# Patient Record
Sex: MALE | Race: WHITE | Employment: FULL TIME | URBAN - METROPOLITAN AREA
[De-identification: names, ages, dates, MRNs, and addresses within clinical notes are randomized per-mention and may not be internally consistent; named-entity substitution may affect disease eponyms.]

---

## 2019-01-29 ENCOUNTER — OFFICE VISIT (OUTPATIENT)
Dept: INTERNAL MEDICINE CLINIC | Facility: CLINIC | Age: 64
End: 2019-01-29
Payer: COMMERCIAL

## 2019-01-29 VITALS
TEMPERATURE: 98 F | RESPIRATION RATE: 16 BRPM | WEIGHT: 265 LBS | HEART RATE: 76 BPM | BODY MASS INDEX: 35.12 KG/M2 | SYSTOLIC BLOOD PRESSURE: 135 MMHG | HEIGHT: 73 IN | DIASTOLIC BLOOD PRESSURE: 85 MMHG

## 2019-01-29 DIAGNOSIS — Z13.220 LIPID SCREENING: ICD-10-CM

## 2019-01-29 DIAGNOSIS — Z12.11 COLON CANCER SCREENING: ICD-10-CM

## 2019-01-29 DIAGNOSIS — I73.9 CLAUDICATION OF BOTH LOWER EXTREMITIES (HCC): ICD-10-CM

## 2019-01-29 DIAGNOSIS — Z13.29 THYROID DISORDER SCREEN: ICD-10-CM

## 2019-01-29 DIAGNOSIS — M72.2 PLANTAR FASCIITIS, BILATERAL: ICD-10-CM

## 2019-01-29 DIAGNOSIS — Z13.0 SCREENING, IRON DEFICIENCY ANEMIA: ICD-10-CM

## 2019-01-29 DIAGNOSIS — Z23 INFLUENZA VACCINE NEEDED: ICD-10-CM

## 2019-01-29 DIAGNOSIS — Z00.00 ANNUAL PHYSICAL EXAM: Primary | ICD-10-CM

## 2019-01-29 DIAGNOSIS — Z13.89 SCREENING FOR GENITOURINARY CONDITION: ICD-10-CM

## 2019-01-29 DIAGNOSIS — M79.89 RIGHT LEG SWELLING: ICD-10-CM

## 2019-01-29 DIAGNOSIS — Z00.00 LABORATORY EXAMINATION ORDERED AS PART OF A ROUTINE GENERAL MEDICAL EXAMINATION: ICD-10-CM

## 2019-01-29 DIAGNOSIS — Z01.84 IMMUNITY STATUS TESTING: ICD-10-CM

## 2019-01-29 DIAGNOSIS — Z12.5 PROSTATE CANCER SCREENING: ICD-10-CM

## 2019-01-29 DIAGNOSIS — E78.2 MIXED HYPERLIPIDEMIA: ICD-10-CM

## 2019-01-29 PROBLEM — Z96.642 HISTORY OF LEFT HIP REPLACEMENT: Status: ACTIVE | Noted: 2019-01-29

## 2019-01-29 PROCEDURE — 99396 PREV VISIT EST AGE 40-64: CPT | Performed by: INTERNAL MEDICINE

## 2019-01-29 PROCEDURE — 99213 OFFICE O/P EST LOW 20 MIN: CPT | Performed by: INTERNAL MEDICINE

## 2019-01-29 PROCEDURE — 90686 IIV4 VACC NO PRSV 0.5 ML IM: CPT | Performed by: INTERNAL MEDICINE

## 2019-01-29 PROCEDURE — 90471 IMMUNIZATION ADMIN: CPT | Performed by: INTERNAL MEDICINE

## 2019-01-29 RX ORDER — VALSARTAN 160 MG/1
160 TABLET ORAL DAILY
COMMUNITY
End: 2019-01-29 | Stop reason: DRUGHIGH

## 2019-01-29 RX ORDER — ROSUVASTATIN CALCIUM 10 MG/1
10 TABLET, COATED ORAL DAILY
Qty: 90 TABLET | Refills: 1 | Status: SHIPPED | OUTPATIENT
Start: 2019-01-29 | End: 2019-07-16

## 2019-01-29 RX ORDER — NIFEDIPINE 60 MG/1
60 TABLET, FILM COATED, EXTENDED RELEASE ORAL DAILY
COMMUNITY
End: 2019-01-29

## 2019-01-29 RX ORDER — METOPROLOL TARTRATE 50 MG/1
25 TABLET, FILM COATED ORAL 2 TIMES DAILY
COMMUNITY
End: 2019-10-21

## 2019-01-29 RX ORDER — VALSARTAN AND HYDROCHLOROTHIAZIDE 320; 25 MG/1; MG/1
1 TABLET, FILM COATED ORAL DAILY
Qty: 90 TABLET | Refills: 1 | Status: SHIPPED | OUTPATIENT
Start: 2019-01-29 | End: 2019-07-16

## 2019-01-30 NOTE — PROGRESS NOTES
HPI:    Patient ID: David Venegas is a 58year old male. HTN     Foot Pain        David Venegas is a 58year old male who presents for a complete physical exam. He returns after years under the care of the [place]  HPI:   Pt complains of :  1.  Right leg ed (CRESTOR) 10 MG Oral Tab Take 1 tablet (10 mg total) by mouth daily. Disp: 90 tablet Rfl: 1   Multiple Vitamin (MULTI-VITAMIN OR) Take 1 tablet by mouth daily.    Disp:  Rfl:       Past Medical History:   Diagnosis Date   • Acute MI ([facility] [address]    • Alopecia    • auscultation  CARDIO: RRR without murmur, decrease DP pulses b/l, right leg sweling and edema, no erythemai  GI: good BS's,no masses, HSM or tenderness  : deferred  MUSCULOSKELETAL: back is not tender,FROM of the back, right calf=47 cm, left calf=43 cm DIARRHEA  Viibryd                 NAUSEA ONLY    Comment:\"TABS\"   PHYSICAL EXAM:   Physical Exam           ASSESSMENT/PLAN:   Annual physical exam  (primary encounter diagnosis)  Lipid screening  Screening for genitourinary condition  Screening, iron def

## 2019-02-04 ENCOUNTER — HOSPITAL ENCOUNTER (OUTPATIENT)
Dept: ULTRASOUND IMAGING | Facility: HOSPITAL | Age: 64
Discharge: HOME | End: 2019-02-04
Attending: INTERNAL MEDICINE
Payer: COMMERCIAL

## 2019-02-04 DIAGNOSIS — I73.9 CLAUDICATION OF BOTH LOWER EXTREMITIES (HCC): ICD-10-CM

## 2019-02-04 DIAGNOSIS — M79.89 RIGHT LEG SWELLING: ICD-10-CM

## 2019-02-04 PROCEDURE — 93971 EXTREMITY STUDY: CPT | Performed by: INTERNAL MEDICINE

## 2019-02-04 PROCEDURE — 93922 UPR/L XTREMITY ART 2 LEVELS: CPT | Performed by: INTERNAL MEDICINE

## 2019-02-05 ENCOUNTER — TELEPHONE (OUTPATIENT)
Dept: INTERNAL MEDICINE CLINIC | Facility: CLINIC | Age: 64
End: 2019-02-05

## 2019-02-05 ENCOUNTER — LAB ENCOUNTER (OUTPATIENT)
Dept: LAB | Age: 64
End: 2019-02-05
Attending: INTERNAL MEDICINE
Payer: COMMERCIAL

## 2019-02-05 DIAGNOSIS — I80.9 THROMBOPHLEBITIS: Primary | ICD-10-CM

## 2019-02-05 DIAGNOSIS — Z12.5 PROSTATE CANCER SCREENING: ICD-10-CM

## 2019-02-05 DIAGNOSIS — Z13.29 THYROID DISORDER SCREEN: ICD-10-CM

## 2019-02-05 DIAGNOSIS — D72.829 LEUKOCYTOSIS, UNSPECIFIED TYPE: Primary | ICD-10-CM

## 2019-02-05 DIAGNOSIS — Z13.220 LIPID SCREENING: ICD-10-CM

## 2019-02-05 DIAGNOSIS — Z01.84 IMMUNITY STATUS TESTING: ICD-10-CM

## 2019-02-05 DIAGNOSIS — Z13.89 SCREENING FOR GENITOURINARY CONDITION: ICD-10-CM

## 2019-02-05 DIAGNOSIS — Z00.00 LABORATORY EXAMINATION ORDERED AS PART OF A ROUTINE GENERAL MEDICAL EXAMINATION: ICD-10-CM

## 2019-02-05 DIAGNOSIS — Z13.0 SCREENING, IRON DEFICIENCY ANEMIA: ICD-10-CM

## 2019-02-05 LAB
ALBUMIN SERPL-MCNC: 3.6 G/DL (ref 3.1–4.5)
ALBUMIN/GLOB SERPL: 0.9 {RATIO} (ref 1–2)
ALP LIVER SERPL-CCNC: 57 U/L (ref 45–117)
ALT SERPL-CCNC: 26 U/L (ref 17–63)
ANION GAP SERPL CALC-SCNC: 9 MMOL/L (ref 0–18)
AST SERPL-CCNC: 18 U/L (ref 15–41)
BASOPHILS # BLD AUTO: 0.03 X10(3) UL (ref 0–0.2)
BASOPHILS NFR BLD AUTO: 0.2 %
BILIRUB SERPL-MCNC: 0.8 MG/DL (ref 0.1–2)
BILIRUB UR QL STRIP.AUTO: NEGATIVE
BUN BLD-MCNC: 18 MG/DL (ref 8–20)
BUN/CREAT SERPL: 12.2 (ref 10–20)
CALCIUM BLD-MCNC: 8.9 MG/DL (ref 8.3–10.3)
CHLORIDE SERPL-SCNC: 102 MMOL/L (ref 101–111)
CHOLEST SMN-MCNC: 120 MG/DL (ref ?–200)
CLARITY UR REFRACT.AUTO: CLEAR
CO2 SERPL-SCNC: 27 MMOL/L (ref 22–32)
COLOR UR AUTO: YELLOW
COMPLEXED PSA SERPL-MCNC: 2.37 NG/ML (ref 0.01–4)
CREAT BLD-MCNC: 1.48 MG/DL (ref 0.7–1.3)
DEPRECATED RDW RBC AUTO: 43.7 FL (ref 35.1–46.3)
EOSINOPHIL # BLD AUTO: 0.04 X10(3) UL (ref 0–0.7)
EOSINOPHIL NFR BLD AUTO: 0.3 %
ERYTHROCYTE [DISTWIDTH] IN BLOOD BY AUTOMATED COUNT: 14.2 % (ref 11–15)
EST. AVERAGE GLUCOSE BLD GHB EST-MCNC: 123 MG/DL (ref 68–126)
GLOBULIN PLAS-MCNC: 4 G/DL (ref 2.8–4.4)
GLUCOSE BLD-MCNC: 111 MG/DL (ref 70–99)
GLUCOSE UR STRIP.AUTO-MCNC: NEGATIVE MG/DL
HBA1C MFR BLD HPLC: 5.9 % (ref ?–5.7)
HCT VFR BLD AUTO: 48.1 % (ref 39–53)
HCV AB SERPL QL IA: NONREACTIVE
HDLC SERPL-MCNC: 35 MG/DL (ref 40–59)
HGB BLD-MCNC: 16.4 G/DL (ref 13–17.5)
IMM GRANULOCYTES # BLD AUTO: 0.03 X10(3) UL (ref 0–1)
IMM GRANULOCYTES NFR BLD: 0.2 %
KETONES UR STRIP.AUTO-MCNC: NEGATIVE MG/DL
LDLC SERPL CALC-MCNC: 71 MG/DL (ref ?–100)
LEUKOCYTE ESTERASE UR QL STRIP.AUTO: NEGATIVE
LYMPHOCYTES # BLD AUTO: 1.51 X10(3) UL (ref 1–4)
LYMPHOCYTES NFR BLD AUTO: 11.9 %
M PROTEIN MFR SERPL ELPH: 7.6 G/DL (ref 6.4–8.2)
MCH RBC QN AUTO: 29 PG (ref 26–34)
MCHC RBC AUTO-ENTMCNC: 34.1 G/DL (ref 31–37)
MCV RBC AUTO: 85.1 FL (ref 80–100)
MONOCYTES # BLD AUTO: 0.92 X10(3) UL (ref 0.1–1)
MONOCYTES NFR BLD AUTO: 7.2 %
NEUTROPHILS # BLD AUTO: 10.21 X10 (3) UL (ref 1.5–7.7)
NEUTROPHILS # BLD AUTO: 10.21 X10(3) UL (ref 1.5–7.7)
NEUTROPHILS NFR BLD AUTO: 80.2 %
NITRITE UR QL STRIP.AUTO: NEGATIVE
NONHDLC SERPL-MCNC: 85 MG/DL (ref ?–130)
OSMOLALITY SERPL CALC.SUM OF ELEC: 289 MOSM/KG (ref 275–295)
PH UR STRIP.AUTO: 5 [PH] (ref 4.5–8)
PLATELET # BLD AUTO: 249 10(3)UL (ref 150–450)
POTASSIUM SERPL-SCNC: 4 MMOL/L (ref 3.6–5.1)
PROT UR STRIP.AUTO-MCNC: NEGATIVE MG/DL
RBC # BLD AUTO: 5.65 X10(6)UL (ref 4.3–5.7)
RBC UR QL AUTO: NEGATIVE
SODIUM SERPL-SCNC: 138 MMOL/L (ref 136–144)
SP GR UR STRIP.AUTO: 1.02 (ref 1–1.03)
TRIGL SERPL-MCNC: 71 MG/DL (ref 30–149)
TSI SER-ACNC: 1.8 MIU/ML (ref 0.35–5.5)
UROBILINOGEN UR STRIP.AUTO-MCNC: <2 MG/DL
VLDLC SERPL CALC-MCNC: 14 MG/DL (ref 0–30)
WBC # BLD AUTO: 12.7 X10(3) UL (ref 4–11)

## 2019-02-05 PROCEDURE — 80061 LIPID PANEL: CPT | Performed by: INTERNAL MEDICINE

## 2019-02-05 PROCEDURE — 86803 HEPATITIS C AB TEST: CPT | Performed by: INTERNAL MEDICINE

## 2019-02-05 PROCEDURE — 84153 ASSAY OF PSA TOTAL: CPT | Performed by: INTERNAL MEDICINE

## 2019-02-05 PROCEDURE — 81003 URINALYSIS AUTO W/O SCOPE: CPT | Performed by: INTERNAL MEDICINE

## 2019-02-05 PROCEDURE — 80050 GENERAL HEALTH PANEL: CPT | Performed by: INTERNAL MEDICINE

## 2019-02-05 PROCEDURE — 83036 HEMOGLOBIN GLYCOSYLATED A1C: CPT | Performed by: INTERNAL MEDICINE

## 2019-02-05 PROCEDURE — 36415 COLL VENOUS BLD VENIPUNCTURE: CPT | Performed by: INTERNAL MEDICINE

## 2019-02-05 RX ORDER — NAPROXEN 500 MG/1
500 TABLET ORAL 2 TIMES DAILY WITH MEALS
Qty: 28 TABLET | Refills: 0 | Status: SHIPPED | OUTPATIENT
Start: 2019-02-05 | End: 2019-03-01

## 2019-02-05 NOTE — TELEPHONE ENCOUNTER
The pt is aware of the results and Rx was sent to retail. The pt did admit to sitting for 8-9 hours a day without walking breaks. The pt will begin to have more movement throughout the day.

## 2019-02-05 NOTE — TELEPHONE ENCOUNTER
----- Message from Ashwin Caro MD sent at 2/5/2019  2:58 PM CST -----  Review at follow up  Check LAP score for leukocytosis

## 2019-02-05 NOTE — TELEPHONE ENCOUNTER
----- Message from Oswald Davis MD sent at 2/4/2019  7:23 PM CST -----  No dvt  thrombophlebitis in the proximal calf segment of the right greater saphenous vein.  Treat with naproxen 500mg bid x 14 days

## 2019-02-06 ENCOUNTER — APPOINTMENT (OUTPATIENT)
Dept: LAB | Age: 64
End: 2019-02-06
Attending: INTERNAL MEDICINE
Payer: COMMERCIAL

## 2019-02-06 DIAGNOSIS — D72.829 LEUKOCYTOSIS, UNSPECIFIED TYPE: ICD-10-CM

## 2019-02-06 PROCEDURE — 36415 COLL VENOUS BLD VENIPUNCTURE: CPT | Performed by: INTERNAL MEDICINE

## 2019-02-06 PROCEDURE — 85540 WBC ALKALINE PHOSPHATASE: CPT | Performed by: INTERNAL MEDICINE

## 2019-02-06 NOTE — TELEPHONE ENCOUNTER
Relayed results to patient and he agreed with plan.  Will complete lab on Friday and follow up on Tuesday as scheduled

## 2019-02-08 LAB — LEUKOCYTE ALKALINE PHOSPHATASE: 192

## 2019-02-12 ENCOUNTER — OFFICE VISIT (OUTPATIENT)
Dept: INTERNAL MEDICINE CLINIC | Facility: CLINIC | Age: 64
End: 2019-02-12
Payer: MEDICARE

## 2019-02-12 VITALS
BODY MASS INDEX: 34.72 KG/M2 | TEMPERATURE: 98 F | HEIGHT: 73 IN | SYSTOLIC BLOOD PRESSURE: 126 MMHG | DIASTOLIC BLOOD PRESSURE: 84 MMHG | HEART RATE: 69 BPM | WEIGHT: 262 LBS | RESPIRATION RATE: 16 BRPM

## 2019-02-12 DIAGNOSIS — I10 ESSENTIAL HYPERTENSION: Primary | ICD-10-CM

## 2019-02-12 DIAGNOSIS — Z23 NEED FOR PNEUMOCOCCAL VACCINATION: ICD-10-CM

## 2019-02-12 DIAGNOSIS — J01.00 ACUTE NON-RECURRENT MAXILLARY SINUSITIS: ICD-10-CM

## 2019-02-12 PROCEDURE — 99213 OFFICE O/P EST LOW 20 MIN: CPT | Performed by: INTERNAL MEDICINE

## 2019-02-12 PROCEDURE — G0009 ADMIN PNEUMOCOCCAL VACCINE: HCPCS | Performed by: INTERNAL MEDICINE

## 2019-02-12 PROCEDURE — 90732 PPSV23 VACC 2 YRS+ SUBQ/IM: CPT | Performed by: INTERNAL MEDICINE

## 2019-02-12 RX ORDER — AZITHROMYCIN 250 MG/1
TABLET, FILM COATED ORAL
Qty: 6 TABLET | Refills: 0 | Status: SHIPPED | OUTPATIENT
Start: 2019-02-12 | End: 2019-05-14 | Stop reason: ALTCHOICE

## 2019-02-12 NOTE — PROGRESS NOTES
HPI:    Patient ID: Marc Antonio is a 71year old male. HPI  Marc Antonio is a 71year old male. HPI:   Patient presents for recheck of his hypertension.  Pt has been taking medications as instructed, no medication side effects, home BP monitorin 1   Rosuvastatin Calcium (CRESTOR) 10 MG Oral Tab Take 1 tablet (10 mg total) by mouth daily. Disp: 90 tablet Rfl: 1   Multiple Vitamin (MULTI-VITAMIN OR) Take 1 tablet by mouth daily.    Disp:  Rfl:       Past Medical History:   Diagnosis Date   • Acute MI Current Outpatient Medications:  azithromycin (ZITHROMAX Z-KENNEDY) 250 MG Oral Tab Take two tablets by mouth today, then one tablet daily.  Disp: 6 tablet Rfl: 0   naproxen 500 MG Oral Tab Take 1 tablet (500 mg total) by mouth 2 (two) times daily with meals

## 2019-03-01 ENCOUNTER — TELEPHONE (OUTPATIENT)
Dept: INTERNAL MEDICINE CLINIC | Facility: CLINIC | Age: 64
End: 2019-03-01

## 2019-03-01 DIAGNOSIS — I80.9 THROMBOPHLEBITIS: ICD-10-CM

## 2019-03-01 RX ORDER — NAPROXEN 500 MG/1
500 TABLET ORAL 2 TIMES DAILY WITH MEALS
Qty: 28 TABLET | Refills: 0 | Status: SHIPPED | OUTPATIENT
Start: 2019-03-01 | End: 2019-03-15

## 2019-03-01 NOTE — TELEPHONE ENCOUNTER
Patient reports swelling subsided while on Naproxen but states he noticed some swelling and stiffness the last couple of days. Denies redness, warmth or pain to calf, SOB or chest pain.      Leta Colon recommends increase mobility, compression wrap and extended

## 2019-03-01 NOTE — TELEPHONE ENCOUNTER
Pt states he was on naproxen 500mg, for issues with his leg, still having some stiffness in knee but swelling has went down in this calf, should he continue on the medication or try something else, call back

## 2019-03-19 ENCOUNTER — TELEPHONE (OUTPATIENT)
Dept: INTERNAL MEDICINE CLINIC | Facility: CLINIC | Age: 64
End: 2019-03-19

## 2019-03-20 ENCOUNTER — TELEPHONE (OUTPATIENT)
Dept: INTERNAL MEDICINE CLINIC | Facility: CLINIC | Age: 64
End: 2019-03-20

## 2019-03-20 NOTE — TELEPHONE ENCOUNTER
Patient said he saw on the news a recall on Valsartan - he is currently taking Valsartan-Hydrochlorothiazide 320-25 MG; does he need to be concerned? Ok to call back and leave detailed message, patient will be at work until 5:00.

## 2019-03-20 NOTE — TELEPHONE ENCOUNTER
Patient has been advised to contact his pharmacy to ask if his script is one of the recalled lot numbers. Otherwise, he was advised to continue on medication as directed. He verbalized understanding and agreed with plan.

## 2019-04-15 ENCOUNTER — TELEPHONE (OUTPATIENT)
Dept: INTERNAL MEDICINE CLINIC | Facility: CLINIC | Age: 64
End: 2019-04-15

## 2019-04-15 DIAGNOSIS — I80.9 THROMBOPHLEBITIS: ICD-10-CM

## 2019-04-15 RX ORDER — NAPROXEN 500 MG/1
500 TABLET ORAL 2 TIMES DAILY WITH MEALS
Qty: 28 TABLET | Refills: 0 | Status: SHIPPED | OUTPATIENT
Start: 2019-04-15 | End: 2019-05-05

## 2019-04-15 NOTE — TELEPHONE ENCOUNTER
Patient called and stated his leg is acting up again, and last time Dr Meng Moser prescribed naproxen 500 MG Oral Tab qty 28 and it worked. Please send another refill to [place] on file, and call him when complete.

## 2019-05-05 DIAGNOSIS — I80.9 THROMBOPHLEBITIS: ICD-10-CM

## 2019-05-06 RX ORDER — NAPROXEN 500 MG/1
TABLET ORAL
Qty: 28 TABLET | Refills: 0 | Status: SHIPPED | OUTPATIENT
Start: 2019-05-06 | End: 2019-06-03

## 2019-05-14 ENCOUNTER — OFFICE VISIT (OUTPATIENT)
Dept: INTERNAL MEDICINE CLINIC | Facility: CLINIC | Age: 64
End: 2019-05-14
Payer: COMMERCIAL

## 2019-05-14 VITALS
HEIGHT: 73 IN | HEART RATE: 69 BPM | TEMPERATURE: 98 F | DIASTOLIC BLOOD PRESSURE: 74 MMHG | SYSTOLIC BLOOD PRESSURE: 118 MMHG | BODY MASS INDEX: 35.52 KG/M2 | WEIGHT: 268 LBS | RESPIRATION RATE: 16 BRPM

## 2019-05-14 DIAGNOSIS — Z00.00 ENCOUNTER FOR ANNUAL HEALTH EXAMINATION: ICD-10-CM

## 2019-05-14 DIAGNOSIS — I10 ESSENTIAL HYPERTENSION: ICD-10-CM

## 2019-05-14 DIAGNOSIS — Z13.6 SCREENING FOR AAA (ABDOMINAL AORTIC ANEURYSM): ICD-10-CM

## 2019-05-14 DIAGNOSIS — N18.30 CKD (CHRONIC KIDNEY DISEASE) STAGE 3, GFR 30-59 ML/MIN (HCC): ICD-10-CM

## 2019-05-14 DIAGNOSIS — I25.10 CORONARY ARTERY DISEASE INVOLVING NATIVE CORONARY ARTERY OF NATIVE HEART WITHOUT ANGINA PECTORIS: ICD-10-CM

## 2019-05-14 DIAGNOSIS — Z00.00 ANNUAL PHYSICAL EXAM: ICD-10-CM

## 2019-05-14 DIAGNOSIS — Z00.00 WELCOME TO MEDICARE PREVENTIVE VISIT: Primary | ICD-10-CM

## 2019-05-14 DIAGNOSIS — N40.0 BENIGN PROSTATIC HYPERPLASIA WITHOUT LOWER URINARY TRACT SYMPTOMS: ICD-10-CM

## 2019-05-14 DIAGNOSIS — E78.2 MIXED HYPERLIPIDEMIA: ICD-10-CM

## 2019-05-14 DIAGNOSIS — Z23 NEED FOR PNEUMOCOCCAL VACCINATION: ICD-10-CM

## 2019-05-14 DIAGNOSIS — Z96.642 HISTORY OF LEFT HIP REPLACEMENT: ICD-10-CM

## 2019-05-14 PROCEDURE — G0403 EKG FOR INITIAL PREVENT EXAM: HCPCS | Performed by: INTERNAL MEDICINE

## 2019-05-14 PROCEDURE — G0402 INITIAL PREVENTIVE EXAM: HCPCS | Performed by: INTERNAL MEDICINE

## 2019-05-14 PROCEDURE — G0009 ADMIN PNEUMOCOCCAL VACCINE: HCPCS | Performed by: INTERNAL MEDICINE

## 2019-05-14 PROCEDURE — 90670 PCV13 VACCINE IM: CPT | Performed by: INTERNAL MEDICINE

## 2019-05-14 NOTE — PATIENT INSTRUCTIONS
Chastity Barrientos's SCREENING SCHEDULE   Tests on this list are recommended by your physician but may not be covered, or covered at this frequency, by your insurer. Please check with your insurance carrier before scheduling to verify coverage.     PREVENTATI are 73-68 years old and have smoked more than 100 cigarettes in their lifetime   • Anyone with a family history    Colorectal Cancer Screening Covered up to Age 76     Colonoscopy Screen   Covered every 10 years- more often if abnormal Colonoscopy due on 0 HepB virus carrier   Homosexual men   Illicit injectable drug abusers     Tetanus Toxoid- Only covered with a cut with metal- TD and TDaP Not covered by Medicare Part B) No orders found for this or any previous visit.  This may be covered with your prescrip

## 2019-05-14 NOTE — PROGRESS NOTES
HPI:   Magdalena Chacon is a 71year old male who presents for a Medicare Initial Preventative Physical Exam (Welcome to Medicare- < 12 months on Medicare).     HPI:  Here for welcome to medicare physical  Normal state of health  Denies chest pain or sob  Katy without lower urinary tract symptoms     Essential hypertension     History of left hip replacement     CKD (chronic kidney disease) stage 3, GFR 30-59 ml/min ([facility])    Wt Readings from Last 3 Encounters:  05/14/19 : 268 lb  02/12/19 : 262 lb  01/29/19 : 26 used smokeless tobacco. He reports that he does not drink alcohol or use drugs.      REVIEW OF SYSTEMS:   GENERAL: feels well otherwise  SKIN: denies any unusual skin lesions  EYES: denies blurred vision or double vision  HEENT: denies nasal congestion, sin curvature, ROM normal, no CVA tenderness   Lungs:   Clear to auscultation bilaterally, respirations unlabored   Chest Wall:  No tenderness or deformity   Heart:  Regular rate and rhythm, S1, S2 normal, no murmur, rub or gallop   Abdomen:   Soft, non-tender continue control of cad risk factors     Mixed hyperlipidemia- stable. On statin. Cont current med therapy     Benign prostatic hyperplasia without lower urinary tract symptoms- stable sxs. Cont to monitor     Essential hypertension- controlled.  Cont curre or if medically necessary Electrocardiogram date    Colorectal Cancer Screening      Colonoscopy Screen every 10 years Colonoscopy due on 02/09/1955 Update Health Maintenance if applicable    Flex Sigmoidoscopy Screen every 10 years No results found for th (mmol/L)   Date Value   05/15/2013 4.3    No flowsheet data found. Creatinine  Annually CREATININE (mg/dL)   Date Value   05/15/2013 1.2     Creatinine (mg/dL)   Date Value   02/05/2019 1.48 (H)    No flowsheet data found.     Drug Serum Conc  Annually N encounter       Imaging & Referrals:  EKG FOR INITIAL PREVENT EXAM  PNEUMOCOCCAL VACC, 13 SAKSHI IM  US ABDOMINAL AORTIC ANEURYSM SCREENING (CPT=76706)       WJ#[id number]

## 2019-05-19 DIAGNOSIS — I80.9 THROMBOPHLEBITIS: ICD-10-CM

## 2019-05-20 RX ORDER — NAPROXEN 500 MG/1
TABLET ORAL
Qty: 28 TABLET | Refills: 0 | OUTPATIENT
Start: 2019-05-20

## 2019-05-21 ENCOUNTER — TELEPHONE (OUTPATIENT)
Dept: INTERNAL MEDICINE CLINIC | Facility: CLINIC | Age: 64
End: 2019-05-21

## 2019-05-21 NOTE — TELEPHONE ENCOUNTER
Left detailed message for patient advising the Naproxen was given as a short term script to help manage his Thrombophlebitis symptoms (14 days on 5/6/19)     Advised he call back to discuss ongoing symptoms and need for refill.

## 2019-05-21 NOTE — TELEPHONE ENCOUNTER
Patient called and stated he tried picking up his Naproxen, and it was denied? He wants to know why.

## 2019-05-22 ENCOUNTER — LAB ENCOUNTER (OUTPATIENT)
Dept: LAB | Age: 64
End: 2019-05-22
Attending: INTERNAL MEDICINE
Payer: COMMERCIAL

## 2019-05-22 DIAGNOSIS — N18.30 CKD (CHRONIC KIDNEY DISEASE) STAGE 3, GFR 30-59 ML/MIN (HCC): ICD-10-CM

## 2019-05-22 PROCEDURE — 85025 COMPLETE CBC W/AUTO DIFF WBC: CPT | Performed by: INTERNAL MEDICINE

## 2019-05-22 PROCEDURE — 36415 COLL VENOUS BLD VENIPUNCTURE: CPT | Performed by: INTERNAL MEDICINE

## 2019-05-22 NOTE — TELEPHONE ENCOUNTER
RUTHYM with details as to why rx was denied and to call office back if he is still experiencing symptoms.

## 2019-06-03 DIAGNOSIS — I80.9 THROMBOPHLEBITIS: ICD-10-CM

## 2019-06-03 RX ORDER — NAPROXEN 500 MG/1
TABLET ORAL
Qty: 28 TABLET | Refills: 0 | Status: SHIPPED | OUTPATIENT
Start: 2019-06-03 | End: 2019-06-18

## 2019-06-03 NOTE — TELEPHONE ENCOUNTER
Pt was having some leg pain and Dr. Gwen Stacy put him on Naproxen, pt has ran out of medication and now is having the leg pain again, can he go back on Naproxen or is there something else he can use, call back and ok to lm

## 2019-06-14 DIAGNOSIS — I80.9 THROMBOPHLEBITIS: ICD-10-CM

## 2019-06-14 RX ORDER — NAPROXEN 500 MG/1
TABLET ORAL
Qty: 28 TABLET | Refills: 0 | OUTPATIENT
Start: 2019-06-14

## 2019-06-15 DIAGNOSIS — I80.9 THROMBOPHLEBITIS: ICD-10-CM

## 2019-06-17 RX ORDER — NAPROXEN 500 MG/1
TABLET ORAL
Qty: 28 TABLET | Refills: 0 | OUTPATIENT
Start: 2019-06-17

## 2019-06-18 ENCOUNTER — TELEPHONE (OUTPATIENT)
Dept: INTERNAL MEDICINE CLINIC | Facility: CLINIC | Age: 64
End: 2019-06-18

## 2019-06-18 DIAGNOSIS — I80.9 THROMBOPHLEBITIS: ICD-10-CM

## 2019-06-18 RX ORDER — NAPROXEN 500 MG/1
TABLET ORAL
Qty: 60 TABLET | Refills: 0 | Status: SHIPPED | OUTPATIENT
Start: 2019-06-18 | End: 2020-08-04 | Stop reason: ALTCHOICE

## 2019-06-18 NOTE — TELEPHONE ENCOUNTER
Rx was sent on originally on 5/6/2019 for thrombophlebitis. A refill was sent on 6/3/2019 for ongoing leg pain. Per Dr. Jacobo Rao, ok to send an Rx for a 30 day supply.  The pt is to be seen in the office for a FU if symptoms persist. The pt verbalized under

## 2019-06-18 NOTE — TELEPHONE ENCOUNTER
Patient called and requested a refill on Naproxen to be sent to [place] on file. Also, he wants to inform office that he completed his [place] today.

## 2019-06-26 ENCOUNTER — MED REC SCAN ONLY (OUTPATIENT)
Dept: INTERNAL MEDICINE CLINIC | Facility: CLINIC | Age: 64
End: 2019-06-26

## 2019-07-13 DIAGNOSIS — I80.9 THROMBOPHLEBITIS: ICD-10-CM

## 2019-07-15 RX ORDER — NAPROXEN 500 MG/1
TABLET ORAL
Qty: 60 TABLET | Refills: 0 | OUTPATIENT
Start: 2019-07-15

## 2019-07-16 DIAGNOSIS — I10 ESSENTIAL HYPERTENSION: Primary | ICD-10-CM

## 2019-07-16 DIAGNOSIS — E78.2 MIXED HYPERLIPIDEMIA: ICD-10-CM

## 2019-07-16 RX ORDER — VALSARTAN AND HYDROCHLOROTHIAZIDE 320; 25 MG/1; MG/1
1 TABLET, FILM COATED ORAL DAILY
Qty: 90 TABLET | Refills: 1 | Status: SHIPPED | OUTPATIENT
Start: 2019-07-16 | End: 2019-11-22

## 2019-07-16 RX ORDER — ROSUVASTATIN CALCIUM 10 MG/1
TABLET, COATED ORAL
Qty: 90 TABLET | Refills: 1 | Status: SHIPPED | OUTPATIENT
Start: 2019-07-16 | End: 2019-11-22

## 2019-10-21 DIAGNOSIS — I10 ESSENTIAL HYPERTENSION: Primary | ICD-10-CM

## 2019-10-21 RX ORDER — METOPROLOL TARTRATE 50 MG/1
25 TABLET, FILM COATED ORAL 2 TIMES DAILY
Qty: 90 TABLET | Refills: 0 | Status: SHIPPED | OUTPATIENT
Start: 2019-10-21 | End: 2019-11-22

## 2019-11-22 ENCOUNTER — OFFICE VISIT (OUTPATIENT)
Dept: INTERNAL MEDICINE CLINIC | Facility: CLINIC | Age: 64
End: 2019-11-22
Payer: COMMERCIAL

## 2019-11-22 VITALS
HEART RATE: 80 BPM | BODY MASS INDEX: 34.72 KG/M2 | HEIGHT: 73 IN | WEIGHT: 262 LBS | RESPIRATION RATE: 16 BRPM | OXYGEN SATURATION: 97 % | SYSTOLIC BLOOD PRESSURE: 132 MMHG | DIASTOLIC BLOOD PRESSURE: 80 MMHG | TEMPERATURE: 98 F

## 2019-11-22 DIAGNOSIS — Z23 NEED FOR INFLUENZA VACCINATION: ICD-10-CM

## 2019-11-22 DIAGNOSIS — I10 ESSENTIAL HYPERTENSION: Primary | ICD-10-CM

## 2019-11-22 PROCEDURE — 99213 OFFICE O/P EST LOW 20 MIN: CPT | Performed by: INTERNAL MEDICINE

## 2019-11-22 PROCEDURE — G0008 ADMIN INFLUENZA VIRUS VAC: HCPCS | Performed by: INTERNAL MEDICINE

## 2019-11-22 PROCEDURE — 90662 IIV NO PRSV INCREASED AG IM: CPT | Performed by: INTERNAL MEDICINE

## 2019-11-22 RX ORDER — ROSUVASTATIN CALCIUM 10 MG/1
10 TABLET, COATED ORAL
Qty: 90 TABLET | Refills: 1 | Status: SHIPPED | OUTPATIENT
Start: 2019-11-22 | End: 2020-06-15

## 2019-11-22 RX ORDER — VALSARTAN AND HYDROCHLOROTHIAZIDE 320; 25 MG/1; MG/1
1 TABLET, FILM COATED ORAL DAILY
Qty: 90 TABLET | Refills: 1 | Status: SHIPPED | OUTPATIENT
Start: 2019-11-22 | End: 2020-06-15

## 2019-11-22 RX ORDER — METOPROLOL TARTRATE 50 MG/1
25 TABLET, FILM COATED ORAL 2 TIMES DAILY
Qty: 90 TABLET | Refills: 0 | Status: SHIPPED | OUTPATIENT
Start: 2019-11-22 | End: 2020-03-18

## 2019-11-22 NOTE — PROGRESS NOTES
HPI:    Patient ID: Mason Santiago is a 71year old male. Hypertension       Mason Santiago is a 71year old male. HPI:   Patient presents for recheck of his hypertension.  Pt has been taking medications as instructed, no medication side effects, ewelina FOOT/TOES SURGERY PROC UNLISTED      foot surgery   • HIP SURGERY  07/2015   • OTHER SURGICAL HISTORY      cath laser 1 distal L anterior Desc artery   • REPAIR OF NASAL SEPTUM  1998      Social History:    Social History    Tobacco Use      Smoking status • aspirin 81 MG Oral Tab Take 81 mg by mouth daily. • Multiple Vitamin (MULTI-VITAMIN OR) Take 1 tablet by mouth daily.          Allergies:  Tetracyclines & Rel*    DIARRHEA  Viibryd                 NAUSEA ONLY    Comment:\"TABS\"   PHYSICAL EXAM:   P

## 2019-12-12 ENCOUNTER — TELEPHONE (OUTPATIENT)
Dept: INTERNAL MEDICINE CLINIC | Facility: CLINIC | Age: 64
End: 2019-12-12

## 2019-12-12 DIAGNOSIS — J18.9 ATYPICAL PNEUMONIA: Primary | ICD-10-CM

## 2019-12-12 RX ORDER — AZITHROMYCIN 250 MG/1
TABLET, FILM COATED ORAL
Qty: 6 TABLET | Refills: 0 | Status: SHIPPED | OUTPATIENT
Start: 2019-12-12 | End: 2020-05-14 | Stop reason: ALTCHOICE

## 2019-12-12 RX ORDER — ALBUTEROL SULFATE 90 UG/1
2 AEROSOL, METERED RESPIRATORY (INHALATION) EVERY 4 HOURS PRN
Qty: 1 INHALER | Refills: 6 | Status: SHIPPED | OUTPATIENT
Start: 2019-12-12 | End: 2020-05-14

## 2019-12-12 NOTE — TELEPHONE ENCOUNTER
Pt states continued productive cough since   12/10/2019, mildly relived with day quil cough suppressant. no nasal congestion or sore throat. No sinus pressure or other presenting s/s.  Pt states past use of zpak have been effective        Will consult with LEON

## 2019-12-12 NOTE — TELEPHONE ENCOUNTER
Patient called and requested a Carson Mcclelland for his cough. Please send to [place] on file.

## 2020-03-06 ENCOUNTER — TELEPHONE (OUTPATIENT)
Dept: INTERNAL MEDICINE CLINIC | Facility: CLINIC | Age: 65
End: 2020-03-06

## 2020-03-06 DIAGNOSIS — M79.673 PAIN OF FOOT, UNSPECIFIED LATERALITY: Primary | ICD-10-CM

## 2020-03-06 NOTE — TELEPHONE ENCOUNTER
Patient asking for a Podiatrist recommendation in the [place]. Ok to leave detailed message on machine.

## 2020-03-06 NOTE — TELEPHONE ENCOUNTER
Roque Hein, [address]  [address], [address]  [facility], [address]  [phone]    Referral placed per pt request     LMOVM with contact information per pt request

## 2020-03-18 DIAGNOSIS — I10 ESSENTIAL HYPERTENSION: ICD-10-CM

## 2020-03-18 RX ORDER — METOPROLOL TARTRATE 50 MG/1
TABLET, FILM COATED ORAL
Qty: 90 TABLET | Refills: 0 | Status: SHIPPED | OUTPATIENT
Start: 2020-03-18 | End: 2020-06-15

## 2020-05-14 ENCOUNTER — OFFICE VISIT (OUTPATIENT)
Dept: INTERNAL MEDICINE CLINIC | Facility: CLINIC | Age: 65
End: 2020-05-14
Payer: COMMERCIAL

## 2020-05-14 VITALS
BODY MASS INDEX: 35.21 KG/M2 | OXYGEN SATURATION: 98 % | DIASTOLIC BLOOD PRESSURE: 72 MMHG | WEIGHT: 260 LBS | HEART RATE: 68 BPM | SYSTOLIC BLOOD PRESSURE: 122 MMHG | HEIGHT: 72 IN | RESPIRATION RATE: 16 BRPM

## 2020-05-14 DIAGNOSIS — Z00.00 LABORATORY EXAMINATION ORDERED AS PART OF A ROUTINE GENERAL MEDICAL EXAMINATION: ICD-10-CM

## 2020-05-14 DIAGNOSIS — Z12.5 PROSTATE CANCER SCREENING: ICD-10-CM

## 2020-05-14 DIAGNOSIS — Z13.0 SCREENING, IRON DEFICIENCY ANEMIA: ICD-10-CM

## 2020-05-14 DIAGNOSIS — Z13.89 SCREENING FOR GENITOURINARY CONDITION: ICD-10-CM

## 2020-05-14 DIAGNOSIS — Z13.29 THYROID DISORDER SCREEN: ICD-10-CM

## 2020-05-14 DIAGNOSIS — M79.671 RIGHT FOOT PAIN: ICD-10-CM

## 2020-05-14 DIAGNOSIS — Z13.220 LIPID SCREENING: ICD-10-CM

## 2020-05-14 DIAGNOSIS — Z00.00 ENCOUNTER FOR ANNUAL HEALTH EXAMINATION: ICD-10-CM

## 2020-05-14 DIAGNOSIS — Z00.00 ANNUAL PHYSICAL EXAM: Primary | ICD-10-CM

## 2020-05-14 PROBLEM — I73.9 CLAUDICATION OF BOTH LOWER EXTREMITIES (HCC): Status: ACTIVE | Noted: 2020-05-14

## 2020-05-14 PROBLEM — I73.9 CLAUDICATION OF BOTH LOWER EXTREMITIES: Status: ACTIVE | Noted: 2020-05-14

## 2020-05-14 PROCEDURE — 99213 OFFICE O/P EST LOW 20 MIN: CPT | Performed by: INTERNAL MEDICINE

## 2020-05-14 PROCEDURE — 99397 PER PM REEVAL EST PAT 65+ YR: CPT | Performed by: INTERNAL MEDICINE

## 2020-05-14 NOTE — PROGRESS NOTES
HPI:    Patient ID: Jade Child is a 76year old male. HPI  Jade Child is a 76year old male who presents for a complete physical exam.   HPI:   Pt complains of right foot pain and swelling for months. Dull. Achey. Worse with ambulation.  Better w naproxen 500 MG Oral Tab TAKE 1 TABLET(500 MG) BY MOUTH TWICE DAILY WITH MEALS. 60 tablet 0   • aspirin 81 MG Oral Tab Take 81 mg by mouth daily. • Multiple Vitamin (MULTI-VITAMIN OR) Take 1 tablet by mouth daily.           Past Medical History:   Diagn clear  NECK: supple,no adenopathy,no bruits  LUNGS: clear to auscultation  CARDIO: RRR without murmur  GI: good BS's,no masses, HSM or tenderness  : deferred  MUSCULOSKELETAL: back is not tender  EXTREMITIES: no cyanosis, clubbing + non pitting edema to screening  Laboratory examination ordered as part of a routine general medical examination  Screening for genitourinary condition  Lipid screening  Screening, iron deficiency anemia  Right foot pain    Orders Placed This Encounter      CBC W/DIFF      COMP

## 2020-05-14 NOTE — PATIENT INSTRUCTIONS
Cory Barrientos's SCREENING SCHEDULE   Tests on this list are recommended by your physician but may not be covered, or covered at this frequency, by your insurer. Please check with your insurance carrier before scheduling to verify coverage.     PREVENTATI • Men who are 73-68 years old and have smoked more than 100 cigarettes in their lifetime   • Anyone with a family history    Colorectal Cancer Screening Covered up to Age 76     Colonoscopy Screen   Covered every 10 years- more often if abnormal Colonosc received Factor VIII or IX concentrates   Clients of institutions for the mentally retarded   Persons who live in the same house as a HepB virus carrier   Homosexual men   Illicit injectable drug abusers     Tetanus Toxoid- Only covered with a cut with met

## 2020-05-14 NOTE — PROGRESS NOTES
HPI:   Zane Garvey is a 76year old male who presents for a Medicare Subsequent Annual Wellness visit (Pt already had Initial Annual Wellness).     HPI    Annual Physical due on 05/14/2021        Fall/Risk Assessment   He has been screened for Falls and History of left hip replacement     CKD (chronic kidney disease) stage 3, GFR 30-59 ml/min ([facility])     Claudication of both lower extremities ([facility] [address]    Wt Readings from Last 3 Encounters:  05/14/20 : 260 lb (117.9 kg)  11/22/19 : 262 lb (118.8 kg)  05/14/19 : reports that he has never smoked. He has never used smokeless tobacco. He reports that he does not drink alcohol or use drugs.      REVIEW OF SYSTEMS:   GENERAL: feels well otherwise  SKIN: denies any unusual skin lesions  EYES: denies blurred vision or zev Regular rate and rhythm, S1, S2 normal, no murmur, rub or gallop   Abdomen:   Soft, non-tender, bowel sounds active all four quadrants,  no masses, no organomegaly   Genitalia: Normal male   Rectal: Normal tone, normal prostate, no masses or tenderness   E physical activity?: Moderate  How would you describe your current health state?: Good  How do you maintain positive mental well-being?: Puzzles;Games    This section provided for quick review of chart, separate sheet to patient  PREVENTATIVE SERVICES  ALESHIA concentrates   Clients of institutions for the mentally retarded   Persons who live in the same house as a HepB virus carrier   Homosexual men   Illicit injectable drug abusers     Tetanus Toxoid  Only covered with a cut with metal- TD and TDaP Not covered OR) Take 1 tablet by mouth daily.          Allergies:  Tetracyclines & Rel*    DIARRHEA  Viibryd                 NAUSEA ONLY    Comment:\"TABS\"   PHYSICAL EXAM:   Physical Exam           ASSESSMENT/PLAN:   Annual physical exam  (primary encounter diagnosis

## 2020-06-01 ENCOUNTER — LAB ENCOUNTER (OUTPATIENT)
Dept: LAB | Age: 65
End: 2020-06-01
Attending: INTERNAL MEDICINE
Payer: COMMERCIAL

## 2020-06-01 DIAGNOSIS — Z13.0 SCREENING, IRON DEFICIENCY ANEMIA: ICD-10-CM

## 2020-06-01 DIAGNOSIS — Z12.5 PROSTATE CANCER SCREENING: ICD-10-CM

## 2020-06-01 DIAGNOSIS — Z13.220 LIPID SCREENING: ICD-10-CM

## 2020-06-01 DIAGNOSIS — Z13.89 SCREENING FOR GENITOURINARY CONDITION: ICD-10-CM

## 2020-06-01 DIAGNOSIS — Z00.00 LABORATORY EXAMINATION ORDERED AS PART OF A ROUTINE GENERAL MEDICAL EXAMINATION: ICD-10-CM

## 2020-06-01 DIAGNOSIS — Z13.29 THYROID DISORDER SCREEN: ICD-10-CM

## 2020-06-01 PROCEDURE — 85025 COMPLETE CBC W/AUTO DIFF WBC: CPT

## 2020-06-01 PROCEDURE — 80053 COMPREHEN METABOLIC PANEL: CPT

## 2020-06-01 PROCEDURE — 80061 LIPID PANEL: CPT

## 2020-06-01 PROCEDURE — 83036 HEMOGLOBIN GLYCOSYLATED A1C: CPT

## 2020-06-01 PROCEDURE — 36415 COLL VENOUS BLD VENIPUNCTURE: CPT

## 2020-06-01 PROCEDURE — 84443 ASSAY THYROID STIM HORMONE: CPT

## 2020-06-01 PROCEDURE — 81001 URINALYSIS AUTO W/SCOPE: CPT

## 2020-06-02 ENCOUNTER — TELEPHONE (OUTPATIENT)
Dept: INTERNAL MEDICINE CLINIC | Facility: CLINIC | Age: 65
End: 2020-06-02

## 2020-06-02 DIAGNOSIS — D72.829 LEUKOCYTOSIS, UNSPECIFIED TYPE: ICD-10-CM

## 2020-06-02 DIAGNOSIS — R80.9 PROTEINURIA, UNSPECIFIED TYPE: Primary | ICD-10-CM

## 2020-06-02 NOTE — TELEPHONE ENCOUNTER
----- Message from Katiuska Scott MD sent at 6/1/2020  5:09 PM CDT -----  No dm2  Renal/lft are stable  Thyroid functions are normal  UA with protein. Check US renal/bladder. flp is at goal. Cont statin  Cbc shows no anemia. Wbc elevated, suspect reactive.

## 2020-06-02 NOTE — TELEPHONE ENCOUNTER
Discussed with patient results and recommendations. Understanding was  expressed. Expected date of blood draw: 6/16/2020. Orders placed.

## 2020-06-14 DIAGNOSIS — I10 ESSENTIAL HYPERTENSION: ICD-10-CM

## 2020-06-14 DIAGNOSIS — E78.2 MIXED HYPERLIPIDEMIA: Primary | ICD-10-CM

## 2020-06-15 ENCOUNTER — TELEPHONE (OUTPATIENT)
Dept: INTERNAL MEDICINE CLINIC | Facility: CLINIC | Age: 65
End: 2020-06-15

## 2020-06-15 DIAGNOSIS — I10 ESSENTIAL HYPERTENSION: Primary | ICD-10-CM

## 2020-06-15 RX ORDER — LOSARTAN POTASSIUM AND HYDROCHLOROTHIAZIDE 25; 100 MG/1; MG/1
1 TABLET ORAL DAILY
Qty: 90 TABLET | Refills: 1 | Status: SHIPPED | OUTPATIENT
Start: 2020-06-15 | End: 2020-12-07

## 2020-06-15 RX ORDER — ROSUVASTATIN CALCIUM 10 MG/1
TABLET, COATED ORAL
Qty: 90 TABLET | Refills: 1 | Status: SHIPPED | OUTPATIENT
Start: 2020-06-15 | End: 2020-12-07

## 2020-06-15 RX ORDER — METOPROLOL TARTRATE 50 MG/1
TABLET, FILM COATED ORAL
Qty: 90 TABLET | Refills: 1 | Status: SHIPPED | OUTPATIENT
Start: 2020-06-15 | End: 2020-12-07

## 2020-06-15 RX ORDER — VALSARTAN AND HYDROCHLOROTHIAZIDE 320; 25 MG/1; MG/1
1 TABLET, FILM COATED ORAL DAILY
Qty: 90 TABLET | Refills: 1 | Status: SHIPPED | OUTPATIENT
Start: 2020-06-15 | End: 2020-06-15 | Stop reason: RX

## 2020-06-15 NOTE — TELEPHONE ENCOUNTER
Per Dr. Shi Kennedy change to Losartan/HCTZ 100/25mg once daily. Left detailed message for pt notifying of above and to call office with questions. Rx sent.

## 2020-06-16 ENCOUNTER — TELEPHONE (OUTPATIENT)
Dept: INTERNAL MEDICINE CLINIC | Facility: CLINIC | Age: 65
End: 2020-06-16

## 2020-06-16 ENCOUNTER — LAB ENCOUNTER (OUTPATIENT)
Dept: LAB | Age: 65
End: 2020-06-16
Attending: INTERNAL MEDICINE
Payer: COMMERCIAL

## 2020-06-16 ENCOUNTER — OFFICE VISIT (OUTPATIENT)
Dept: PODIATRY CLINIC | Facility: CLINIC | Age: 65
End: 2020-06-16
Payer: COMMERCIAL

## 2020-06-16 ENCOUNTER — HOSPITAL ENCOUNTER (OUTPATIENT)
Dept: ULTRASOUND IMAGING | Age: 65
Discharge: HOME | End: 2020-06-16
Attending: INTERNAL MEDICINE
Payer: COMMERCIAL

## 2020-06-16 VITALS — TEMPERATURE: 98 F

## 2020-06-16 DIAGNOSIS — N28.89 LEFT KIDNEY MASS: Primary | ICD-10-CM

## 2020-06-16 DIAGNOSIS — M79.672 LEFT FOOT PAIN: Primary | ICD-10-CM

## 2020-06-16 DIAGNOSIS — R80.9 PROTEINURIA, UNSPECIFIED TYPE: ICD-10-CM

## 2020-06-16 DIAGNOSIS — M79.671 RIGHT FOOT PAIN: ICD-10-CM

## 2020-06-16 DIAGNOSIS — D72.829 LEUKOCYTOSIS, UNSPECIFIED TYPE: ICD-10-CM

## 2020-06-16 DIAGNOSIS — D72.829 LEUKOCYTOSIS, UNSPECIFIED TYPE: Primary | ICD-10-CM

## 2020-06-16 PROCEDURE — 99203 OFFICE O/P NEW LOW 30 MIN: CPT | Performed by: PODIATRIST

## 2020-06-16 PROCEDURE — 36415 COLL VENOUS BLD VENIPUNCTURE: CPT | Performed by: INTERNAL MEDICINE

## 2020-06-16 PROCEDURE — 76770 US EXAM ABDO BACK WALL COMP: CPT | Performed by: INTERNAL MEDICINE

## 2020-06-16 PROCEDURE — 85025 COMPLETE CBC W/AUTO DIFF WBC: CPT | Performed by: INTERNAL MEDICINE

## 2020-06-16 NOTE — TELEPHONE ENCOUNTER
Margarita Browne MD    [facility]    [address] [facility], [address]    [phone]       Spoke with patient and discussed results and  recommendations and understanding was expressed.      Patient requested My Chart

## 2020-06-16 NOTE — TELEPHONE ENCOUNTER
----- Message from Marcelle Longoria MD sent at 6/16/2020  8:41 AM CDT -----  upper pole the left kidney, there is a 1.8 x 1.5 cm solid hypoechoic mass   CT of kidneys renal mass protocol for further eval  Refer to Dr Angelo Fong (urology) for eval

## 2020-06-16 NOTE — PROGRESS NOTES
Uriel Fischer is a 76year old male.  Patient presents with:  New Patient: bilateral foot pain and numbnes - he feels like he is walking on rocks - symptoms have been present for over a year - barefoot pain scale 6/10 - denies taking pain medication - feet Never Used    Substance and Sexual Activity      Alcohol use: No      Drug use: No    Other Topics      Concerns:        Caffeine Concern: Yes        Exercise: No          REVIEW OF SYSTEMS:   Review of Systems    Today reviewed systems as documented below SHIN  6/16/2020

## 2020-06-16 NOTE — TELEPHONE ENCOUNTER
Willie Mullen MD    [address], [address]    [phone]       Spoke with patient and discussed results  recommendations and understanding was expressed.      Patient informed to contact [facility]

## 2020-06-16 NOTE — TELEPHONE ENCOUNTER
----- Message from Chelsey Mccoy MD sent at 6/16/2020  3:04 PM CDT -----  Elevated wbc is stable.   Refer to DR Bard Solis for eval  Await CT results for eval of renal mass

## 2020-06-23 PROBLEM — N28.89 LEFT RENAL MASS: Status: ACTIVE | Noted: 2020-06-23

## 2020-06-27 ENCOUNTER — HOSPITAL ENCOUNTER (OUTPATIENT)
Dept: CT IMAGING | Facility: HOSPITAL | Age: 65
Discharge: HOME | End: 2020-06-27
Attending: INTERNAL MEDICINE
Payer: COMMERCIAL

## 2020-06-27 DIAGNOSIS — N28.89 LEFT KIDNEY MASS: ICD-10-CM

## 2020-06-27 PROCEDURE — 74170 CT ABD WO CNTRST FLWD CNTRST: CPT | Performed by: INTERNAL MEDICINE

## 2020-06-29 ENCOUNTER — TELEPHONE (OUTPATIENT)
Dept: INTERNAL MEDICINE CLINIC | Facility: CLINIC | Age: 65
End: 2020-06-29

## 2020-06-29 DIAGNOSIS — N28.89 RENAL MASS: Primary | ICD-10-CM

## 2020-06-29 NOTE — TELEPHONE ENCOUNTER
----- Message from Molly Cheadle, MD sent at 6/29/2020  8:08 AM CDT -----  solid enhancing left renal mass highly suspicious for renal cell carcinoma    Recommend pt seeing DR Dalton Marquez [facility]) for eval

## 2020-06-30 ENCOUNTER — OFFICE VISIT (OUTPATIENT)
Dept: SURGERY | Facility: CLINIC | Age: 65
End: 2020-06-30
Payer: COMMERCIAL

## 2020-06-30 ENCOUNTER — TELEPHONE (OUTPATIENT)
Dept: INTERNAL MEDICINE CLINIC | Facility: CLINIC | Age: 65
End: 2020-06-30

## 2020-06-30 VITALS — DIASTOLIC BLOOD PRESSURE: 78 MMHG | SYSTOLIC BLOOD PRESSURE: 121 MMHG | HEART RATE: 71 BPM

## 2020-06-30 DIAGNOSIS — R82.90 URINE FINDING: ICD-10-CM

## 2020-06-30 DIAGNOSIS — N28.89 LEFT RENAL MASS: Primary | ICD-10-CM

## 2020-06-30 PROCEDURE — 3074F SYST BP LT 130 MM HG: CPT | Performed by: UROLOGY

## 2020-06-30 PROCEDURE — 99204 OFFICE O/P NEW MOD 45 MIN: CPT | Performed by: UROLOGY

## 2020-06-30 PROCEDURE — 3078F DIAST BP <80 MM HG: CPT | Performed by: UROLOGY

## 2020-06-30 PROCEDURE — 81003 URINALYSIS AUTO W/O SCOPE: CPT | Performed by: UROLOGY

## 2020-06-30 NOTE — TELEPHONE ENCOUNTER
Per dr Nancy Gomez no need to f/u with card, pt will still need to f/u with hem/onc post visit with urology and post removal of renal cyst if lab work continues to remain abnormal.

## 2020-06-30 NOTE — TELEPHONE ENCOUNTER
Pt had a CT Scan on Sat that revealed a mass on his Kidney. Today at noon he will be seeing a urologist and is expecting that this will result in a surgery to remove the mass from the kidney or the kidney all together.      The patient said he is a hear

## 2020-07-17 ENCOUNTER — TELEPHONE (OUTPATIENT)
Dept: INTERNAL MEDICINE CLINIC | Facility: CLINIC | Age: 65
End: 2020-07-17

## 2020-07-17 NOTE — TELEPHONE ENCOUNTER
Patient called, said he was scheduled for a CT kidney ablation entered by Dr Spencer Madera, which was rescheduled from 7/21 to 7/28, but central scheduling called him to say he needs a physical done prior to that test. Patient was upset that he was never told initi

## 2020-07-17 NOTE — TELEPHONE ENCOUNTER
Spoke with Rojas Rehman in [place] [address] pt only needs H&P no labs or EKG needed. [employer] message sent to pt.

## 2020-07-21 ENCOUNTER — HOSPITAL ENCOUNTER (OUTPATIENT)
Dept: CT IMAGING | Facility: HOSPITAL | Age: 65
Discharge: HOME | End: 2020-07-21
Attending: UROLOGY
Payer: COMMERCIAL

## 2020-07-21 ENCOUNTER — APPOINTMENT (OUTPATIENT)
Dept: LAB | Facility: HOSPITAL | Age: 65
End: 2020-07-21
Attending: UROLOGY
Payer: COMMERCIAL

## 2020-07-25 ENCOUNTER — OFFICE VISIT (OUTPATIENT)
Dept: INTERNAL MEDICINE CLINIC | Facility: CLINIC | Age: 65
End: 2020-07-25
Payer: COMMERCIAL

## 2020-07-25 VITALS
HEART RATE: 68 BPM | DIASTOLIC BLOOD PRESSURE: 80 MMHG | WEIGHT: 256 LBS | HEIGHT: 72 IN | BODY MASS INDEX: 34.67 KG/M2 | TEMPERATURE: 98 F | SYSTOLIC BLOOD PRESSURE: 122 MMHG | RESPIRATION RATE: 16 BRPM

## 2020-07-25 DIAGNOSIS — N28.89 LEFT RENAL MASS: ICD-10-CM

## 2020-07-25 DIAGNOSIS — I25.10 CORONARY ARTERY DISEASE INVOLVING NATIVE CORONARY ARTERY OF NATIVE HEART WITHOUT ANGINA PECTORIS: ICD-10-CM

## 2020-07-25 DIAGNOSIS — Z01.818 PREOP EXAMINATION: Primary | ICD-10-CM

## 2020-07-25 DIAGNOSIS — N18.30 CKD (CHRONIC KIDNEY DISEASE) STAGE 3, GFR 30-59 ML/MIN (HCC): ICD-10-CM

## 2020-07-25 DIAGNOSIS — I10 ESSENTIAL HYPERTENSION: ICD-10-CM

## 2020-07-25 PROBLEM — I73.9 CLAUDICATION OF BOTH LOWER EXTREMITIES: Status: RESOLVED | Noted: 2020-05-14 | Resolved: 2020-07-25

## 2020-07-25 PROBLEM — I73.9 CLAUDICATION OF BOTH LOWER EXTREMITIES (HCC): Status: RESOLVED | Noted: 2020-05-14 | Resolved: 2020-07-25

## 2020-07-25 PROCEDURE — 3008F BODY MASS INDEX DOCD: CPT | Performed by: PHYSICIAN ASSISTANT

## 2020-07-25 PROCEDURE — 99214 OFFICE O/P EST MOD 30 MIN: CPT | Performed by: PHYSICIAN ASSISTANT

## 2020-07-25 PROCEDURE — 3074F SYST BP LT 130 MM HG: CPT | Performed by: PHYSICIAN ASSISTANT

## 2020-07-25 PROCEDURE — 3079F DIAST BP 80-89 MM HG: CPT | Performed by: PHYSICIAN ASSISTANT

## 2020-07-25 NOTE — H&P
Maranda Proctor is a 76year old year old male who presents for a pre-operative physical exam.  Patient is to have CT kidney ablation with anesthesia, to be done by Dr. Yanni Rainey at [facility] on date TBD.     HPI:    Patient electing to undergo abla status: Never Smoker      Smokeless tobacco: Never Used    Alcohol use: No    Drug use: No     REVIEW OF SYSTEMS:    GENERAL: feels well otherwise  SKIN: denies any unusual skin lesions  EYES:denies blurred vision or double vision  HEENT: denies nasal [facility] on date TBD.    Pt has the following conditions:   Patient Active Problem List:     Coronary artery disease involving native coronary artery of native heart without angina pectoris     Mixed hyperlipidemia     Benign prostatic hyperplasia without l

## 2020-07-28 ENCOUNTER — APPOINTMENT (OUTPATIENT)
Dept: LAB | Facility: HOSPITAL | Age: 65
End: 2020-07-28
Attending: UROLOGY
Payer: COMMERCIAL

## 2020-07-28 ENCOUNTER — HOSPITAL ENCOUNTER (OUTPATIENT)
Dept: CT IMAGING | Facility: HOSPITAL | Age: 65
Discharge: HOME | End: 2020-07-28
Attending: UROLOGY
Payer: COMMERCIAL

## 2020-07-31 ENCOUNTER — TELEPHONE (OUTPATIENT)
Dept: INTERNAL MEDICINE CLINIC | Facility: CLINIC | Age: 65
End: 2020-07-31

## 2020-07-31 NOTE — TELEPHONE ENCOUNTER
Patient called to note his frustration with THE [facility] scheduling and CT department - he says his CT kidney ablation has been rescheduled multiple times now, and he is frustrated with the seemingly lack of communication between parties as to what is needed for

## 2020-08-01 ENCOUNTER — APPOINTMENT (OUTPATIENT)
Dept: LAB | Facility: HOSPITAL | Age: 65
End: 2020-08-01
Attending: PHYSICIAN ASSISTANT
Payer: COMMERCIAL

## 2020-08-01 DIAGNOSIS — Z01.818 PREOP EXAMINATION: ICD-10-CM

## 2020-08-01 LAB
ANION GAP SERPL CALC-SCNC: <0 MMOL/L (ref 0–18)
BUN BLD-MCNC: 13 MG/DL (ref 7–18)
BUN/CREAT SERPL: 10.7 (ref 10–20)
CALCIUM BLD-MCNC: 8.5 MG/DL (ref 8.5–10.1)
CHLORIDE SERPL-SCNC: 109 MMOL/L (ref 98–112)
CO2 SERPL-SCNC: 29 MMOL/L (ref 21–32)
CREAT BLD-MCNC: 1.21 MG/DL (ref 0.7–1.3)
GLUCOSE BLD-MCNC: 104 MG/DL (ref 70–99)
OSMOLALITY SERPL CALC.SUM OF ELEC: 284 MOSM/KG (ref 275–295)
PATIENT FASTING Y/N/NP: YES
POTASSIUM SERPL-SCNC: 4.1 MMOL/L (ref 3.5–5.1)
SODIUM SERPL-SCNC: 137 MMOL/L (ref 136–145)

## 2020-08-01 PROCEDURE — 80048 BASIC METABOLIC PNL TOTAL CA: CPT

## 2020-08-01 PROCEDURE — 36415 COLL VENOUS BLD VENIPUNCTURE: CPT

## 2020-08-04 ENCOUNTER — TELEPHONE (OUTPATIENT)
Dept: INTERNAL MEDICINE CLINIC | Facility: CLINIC | Age: 65
End: 2020-08-04

## 2020-08-04 RX ORDER — SODIUM CHLORIDE, SODIUM LACTATE, POTASSIUM CHLORIDE, CALCIUM CHLORIDE 600; 310; 30; 20 MG/100ML; MG/100ML; MG/100ML; MG/100ML
INJECTION, SOLUTION INTRAVENOUS CONTINUOUS
Status: CANCELLED | OUTPATIENT
Start: 2020-08-04

## 2020-08-04 NOTE — TELEPHONE ENCOUNTER
Procedure coming up 8/12   Pt has been advised he needs to consult with PCP baby asprin and Naproxin about stopping this before his procedure, beginning tomorrow.      Please call back to discuss

## 2020-08-04 NOTE — TELEPHONE ENCOUNTER
Per dr reyes hold aspirin and naproxen 7 days prior to kidney ablation.      Pt verbalizes  understanding of new information and plan of care

## 2020-08-06 NOTE — IMAGING NOTE
Called patient and message left to call back regarding ablation, If he had any questions for Dr Haroon Dyer.

## 2020-08-10 ENCOUNTER — LAB ENCOUNTER (OUTPATIENT)
Dept: LAB | Facility: HOSPITAL | Age: 65
End: 2020-08-10
Attending: UROLOGY
Payer: COMMERCIAL

## 2020-08-10 DIAGNOSIS — Z01.812 PRE-PROCEDURE LAB EXAM: Primary | ICD-10-CM

## 2020-08-10 NOTE — IMAGING NOTE
Pt called on behalf of Dr. Bekah Dodson regarding ablation scheduled for 8/12. Call back number left.

## 2020-08-11 ENCOUNTER — APPOINTMENT (OUTPATIENT)
Dept: LAB | Facility: HOSPITAL | Age: 65
End: 2020-08-11
Attending: UROLOGY
Payer: COMMERCIAL

## 2020-08-11 ENCOUNTER — ANESTHESIA EVENT (OUTPATIENT)
Dept: CT IMAGING | Facility: HOSPITAL | Age: 65
End: 2020-08-11
Payer: COMMERCIAL

## 2020-08-11 ENCOUNTER — HOSPITAL ENCOUNTER (OUTPATIENT)
Dept: CT IMAGING | Facility: HOSPITAL | Age: 65
Discharge: HOME | End: 2020-08-11
Attending: UROLOGY
Payer: COMMERCIAL

## 2020-08-11 LAB — SARS-COV-2 RNA RESP QL NAA+PROBE: NOT DETECTED

## 2020-08-11 RX ORDER — MIDAZOLAM HYDROCHLORIDE 1 MG/ML
1 INJECTION INTRAMUSCULAR; INTRAVENOUS EVERY 5 MIN PRN
Status: CANCELLED | OUTPATIENT
Start: 2020-08-12 | End: 2020-08-12

## 2020-08-11 RX ORDER — NALOXONE HYDROCHLORIDE 0.4 MG/ML
80 INJECTION, SOLUTION INTRAMUSCULAR; INTRAVENOUS; SUBCUTANEOUS AS NEEDED
Status: CANCELLED | OUTPATIENT
Start: 2020-08-11

## 2020-08-11 RX ORDER — SODIUM CHLORIDE 9 MG/ML
INJECTION, SOLUTION INTRAVENOUS CONTINUOUS
Status: CANCELLED | OUTPATIENT
Start: 2020-08-11

## 2020-08-11 RX ORDER — FLUMAZENIL 0.1 MG/ML
0.2 INJECTION, SOLUTION INTRAVENOUS AS NEEDED
Status: CANCELLED | OUTPATIENT
Start: 2020-08-11

## 2020-08-11 NOTE — ANESTHESIA PREPROCEDURE EVALUATION
PRE-OP EVALUATION    Patient Name: Neris Aguilera    Pre-op Diagnosis: Renal mass    CT KIDNEY [address]   * Surgery not found *    Pre-op vitals reviewed. Body mass index is 34.72 kg/m². Current medications reviewed.   [address] [employer] Used    Alcohol use: No      Drug use: No     Available pre-op labs reviewed.   Lab Results   Component Value Date    WBC 11.4 (H) 06/16/2020    RBC 5.56 06/16/2020    HGB 15.6 06/16/2020    HCT 48.1 06/16/2020    MCV 86.5 06/16/2020    MCH 28.1 06/16/2020

## 2020-08-11 NOTE — IMAGING NOTE
Called  Patient to give  Pre procedure instruction unable to reach patient or leave a message. Attempted to call daughter unable to reach her.

## 2020-08-12 ENCOUNTER — APPOINTMENT (OUTPATIENT)
Dept: LAB | Facility: HOSPITAL | Age: 65
End: 2020-08-12
Attending: UROLOGY
Payer: COMMERCIAL

## 2020-08-12 ENCOUNTER — ANESTHESIA (OUTPATIENT)
Dept: CT IMAGING | Facility: HOSPITAL | Age: 65
End: 2020-08-12
Payer: COMMERCIAL

## 2020-08-12 ENCOUNTER — HOSPITAL ENCOUNTER (OUTPATIENT)
Dept: CT IMAGING | Facility: HOSPITAL | Age: 65
Discharge: HOME | End: 2020-08-12
Attending: UROLOGY
Payer: COMMERCIAL

## 2020-08-12 ENCOUNTER — TELEPHONE (OUTPATIENT)
Dept: SURGERY | Facility: CLINIC | Age: 65
End: 2020-08-12

## 2020-08-12 VITALS
RESPIRATION RATE: 18 BRPM | HEART RATE: 64 BPM | DIASTOLIC BLOOD PRESSURE: 83 MMHG | SYSTOLIC BLOOD PRESSURE: 124 MMHG | WEIGHT: 256 LBS | HEIGHT: 72 IN | OXYGEN SATURATION: 100 % | BODY MASS INDEX: 34.67 KG/M2 | TEMPERATURE: 96 F

## 2020-08-12 DIAGNOSIS — N28.89 RENAL MASS: Primary | ICD-10-CM

## 2020-08-12 DIAGNOSIS — N28.89 LEFT RENAL MASS: ICD-10-CM

## 2020-08-12 DIAGNOSIS — N28.89 LEFT RENAL MASS: Primary | ICD-10-CM

## 2020-08-12 DIAGNOSIS — N28.89 RENAL MASS: ICD-10-CM

## 2020-08-12 LAB
DEPRECATED RDW RBC AUTO: 44.2 FL (ref 35.1–46.3)
ERYTHROCYTE [DISTWIDTH] IN BLOOD BY AUTOMATED COUNT: 14.1 % (ref 11–15)
HCT VFR BLD AUTO: 46.5 % (ref 39–53)
HGB BLD-MCNC: 15 G/DL (ref 13–17.5)
INR BLD: 1.03 (ref 0.89–1.11)
MCH RBC QN AUTO: 27.8 PG (ref 26–34)
MCHC RBC AUTO-ENTMCNC: 32.3 G/DL (ref 31–37)
MCV RBC AUTO: 86.3 FL (ref 80–100)
PLATELET # BLD AUTO: 263 10(3)UL (ref 150–450)
PSA SERPL DL<=0.01 NG/ML-MCNC: 13.8 SECONDS (ref 12.4–14.6)
RBC # BLD AUTO: 5.39 X10(6)UL (ref 3.8–5.8)
WBC # BLD AUTO: 8.6 X10(3) UL (ref 4–11)

## 2020-08-12 PROCEDURE — 88305 TISSUE EXAM BY PATHOLOGIST: CPT | Performed by: UROLOGY

## 2020-08-12 PROCEDURE — 77013 CT GUIDE FOR TISSUE ABLATION: CPT | Performed by: UROLOGY

## 2020-08-12 PROCEDURE — 50200 RENAL BIOPSY PERQ: CPT | Performed by: UROLOGY

## 2020-08-12 PROCEDURE — 88342 IMHCHEM/IMCYTCHM 1ST ANTB: CPT

## 2020-08-12 PROCEDURE — 88321 CONSLTJ&REPRT SLD PREP ELSWR: CPT | Performed by: UROLOGY

## 2020-08-12 PROCEDURE — 50592 PERC RF ABLATE RENAL TUMOR: CPT | Performed by: UROLOGY

## 2020-08-12 PROCEDURE — 85027 COMPLETE CBC AUTOMATED: CPT

## 2020-08-12 PROCEDURE — 77012 CT SCAN FOR NEEDLE BIOPSY: CPT | Performed by: UROLOGY

## 2020-08-12 PROCEDURE — 36415 COLL VENOUS BLD VENIPUNCTURE: CPT

## 2020-08-12 PROCEDURE — 85610 PROTHROMBIN TIME: CPT

## 2020-08-12 PROCEDURE — 88341 IMHCHEM/IMCYTCHM EA ADD ANTB: CPT

## 2020-08-12 RX ORDER — SODIUM CHLORIDE, SODIUM LACTATE, POTASSIUM CHLORIDE, CALCIUM CHLORIDE 600; 310; 30; 20 MG/100ML; MG/100ML; MG/100ML; MG/100ML
INJECTION, SOLUTION INTRAVENOUS CONTINUOUS
Status: DISCONTINUED | OUTPATIENT
Start: 2020-08-12 | End: 2020-08-14

## 2020-08-12 RX ORDER — MORPHINE SULFATE 2 MG/ML
2 INJECTION, SOLUTION INTRAMUSCULAR; INTRAVENOUS EVERY 2 HOUR PRN
Status: DISCONTINUED | OUTPATIENT
Start: 2020-08-12 | End: 2020-08-14

## 2020-08-12 RX ORDER — ONDANSETRON 2 MG/ML
4 INJECTION INTRAMUSCULAR; INTRAVENOUS EVERY 6 HOURS PRN
Status: DISCONTINUED | OUTPATIENT
Start: 2020-08-12 | End: 2020-08-14

## 2020-08-12 RX ORDER — HYDROMORPHONE HYDROCHLORIDE 1 MG/ML
0.4 INJECTION, SOLUTION INTRAMUSCULAR; INTRAVENOUS; SUBCUTANEOUS EVERY 5 MIN PRN
Status: ACTIVE | OUTPATIENT
Start: 2020-08-12 | End: 2020-08-12

## 2020-08-12 RX ORDER — EPHEDRINE SULFATE 50 MG/ML
INJECTION, SOLUTION INTRAVENOUS AS NEEDED
Status: DISCONTINUED | OUTPATIENT
Start: 2020-08-12 | End: 2020-08-12 | Stop reason: SURG

## 2020-08-12 RX ORDER — LIDOCAINE HYDROCHLORIDE 10 MG/ML
INJECTION, SOLUTION EPIDURAL; INFILTRATION; INTRACAUDAL; PERINEURAL AS NEEDED
Status: DISCONTINUED | OUTPATIENT
Start: 2020-08-12 | End: 2020-08-12 | Stop reason: SURG

## 2020-08-12 RX ORDER — ACETAMINOPHEN 325 MG/1
650 TABLET ORAL EVERY 6 HOURS PRN
Status: DISCONTINUED | OUTPATIENT
Start: 2020-08-12 | End: 2020-08-14

## 2020-08-12 RX ORDER — NALOXONE HYDROCHLORIDE 0.4 MG/ML
80 INJECTION, SOLUTION INTRAMUSCULAR; INTRAVENOUS; SUBCUTANEOUS AS NEEDED
Status: ACTIVE | OUTPATIENT
Start: 2020-08-12 | End: 2020-08-12

## 2020-08-12 RX ORDER — ACETAMINOPHEN 325 MG/1
TABLET ORAL
Status: COMPLETED
Start: 2020-08-12 | End: 2020-08-12

## 2020-08-12 RX ORDER — ROCURONIUM BROMIDE 10 MG/ML
INJECTION, SOLUTION INTRAVENOUS AS NEEDED
Status: DISCONTINUED | OUTPATIENT
Start: 2020-08-12 | End: 2020-08-12 | Stop reason: SURG

## 2020-08-12 RX ADMIN — EPHEDRINE SULFATE 10 MG: 50 INJECTION, SOLUTION INTRAVENOUS at 13:23:00

## 2020-08-12 RX ADMIN — ACETAMINOPHEN 650 MG: 325 TABLET ORAL at 16:11:00

## 2020-08-12 RX ADMIN — EPHEDRINE SULFATE 10 MG: 50 INJECTION, SOLUTION INTRAVENOUS at 13:01:00

## 2020-08-12 RX ADMIN — ROCURONIUM BROMIDE 20 MG: 10 INJECTION, SOLUTION INTRAVENOUS at 12:57:00

## 2020-08-12 RX ADMIN — LIDOCAINE HYDROCHLORIDE 100 MG: 10 INJECTION, SOLUTION EPIDURAL; INFILTRATION; INTRACAUDAL; PERINEURAL at 12:57:00

## 2020-08-12 RX ADMIN — SODIUM CHLORIDE, SODIUM LACTATE, POTASSIUM CHLORIDE, CALCIUM CHLORIDE: 600; 310; 30; 20 INJECTION, SOLUTION INTRAVENOUS at 14:55:00

## 2020-08-12 NOTE — ANESTHESIA POSTPROCEDURE EVALUATION
[address] Patient Status:  Outpatient   Age/Gender 76year old male MRN [id number]   [facility] Attending Stalin Chang MD   [facility] Day # 0 PCP Wyatt Hodge MD       Anesthesia Post-op Note    * No procedures listed

## 2020-08-12 NOTE — IMAGING NOTE
Received pt to radiology holding area, accompanied by daughter, [phone]. Pt here for Radiofrequency ablation L kidney mass. Pt states NPO since 1800 last night. Pt states he does not take blood thinners. Last ASA dose 7 days ago.   Dr. Scar Pimentel to [facility] fo

## 2020-08-12 NOTE — ANESTHESIA PROCEDURE NOTES
Airway  Date/Time: 8/12/2020 12:59 PM  Urgency: elective    Airway not difficult    General Information and Staff    Patient location during procedure: OR  Anesthesiologist: Irais Celis MD  Performed: anesthesiologist     Indications and Patient Conditio

## 2020-08-12 NOTE — PROCEDURES
[facility]  Procedure Note    Wendy Reese Patient Status:  Outpatient    1955 MRN [id number]   [facility] CT Attending Cat Lennon MD   Hosp Day # 0 PCP Jing Price MD     Procedure: CT guided L renal biopsy, L renal microw

## 2020-08-13 ENCOUNTER — TELEPHONE (OUTPATIENT)
Dept: SURGERY | Facility: CLINIC | Age: 65
End: 2020-08-13

## 2020-08-13 NOTE — TELEPHONE ENCOUNTER
Per pt had CT kidney rad frequency ablation procedure yesterday, states everything went well, states biopsy was done on kidney mass, states preliminary findings are suspicious, states results should be in next week.

## 2020-08-17 NOTE — TELEPHONE ENCOUNTER
RN called patient in response to his CT kidney and CT biopsy results. RN informed patient MD on vacation. Patient willing to wait for MD's return. No questions at this time.

## 2020-08-20 ENCOUNTER — TELEPHONE (OUTPATIENT)
Dept: SURGERY | Facility: CLINIC | Age: 65
End: 2020-08-20

## 2020-08-20 NOTE — TELEPHONE ENCOUNTER
Gerry Carlisle,  I have reviewed your test results and left you a voicemail. Biopsy was positive for cancer. No changes to plan. You should f/u with me in 3 months with imaging prior to appointment to ensure mass has resolved.  Per guidelines we typically check im

## 2020-08-21 ENCOUNTER — TELEPHONE (OUTPATIENT)
Dept: SURGERY | Facility: CLINIC | Age: 65
End: 2020-08-21

## 2020-08-21 ENCOUNTER — TELEPHONE (OUTPATIENT)
Dept: INTERNAL MEDICINE CLINIC | Facility: CLINIC | Age: 65
End: 2020-08-21

## 2020-08-21 DIAGNOSIS — N28.89 LEFT RENAL MASS: Primary | ICD-10-CM

## 2020-08-21 NOTE — TELEPHONE ENCOUNTER
Good Morning Dr. Ross Room    Please have clinical staff provide cpt code to submit for prior auth for biopsy referral    Thanks    [address] [facility],[address]

## 2020-08-21 NOTE — TELEPHONE ENCOUNTER
I already put that code in when I ordered the original test. It's left renal mass. Why do you need the code again?

## 2020-08-21 NOTE — TELEPHONE ENCOUNTER
Patient called and wants to express his gratitude to Dr Gwen Stacy and staff for saving his life.  If it was 'not for the annual physical completed, and completing his UA- things would have been different today.' He received a call from his urologist, and the mas

## 2020-08-24 NOTE — TELEPHONE ENCOUNTER
RN called patient to offer a sooner appt, but he preferred to keep his appt on 10/29 Wed 8am. His CT is on 10/21.

## 2020-09-30 ENCOUNTER — OFFICE VISIT (OUTPATIENT)
Dept: INTERNAL MEDICINE CLINIC | Facility: CLINIC | Age: 65
End: 2020-09-30
Payer: COMMERCIAL

## 2020-09-30 VITALS
HEIGHT: 72 IN | RESPIRATION RATE: 18 BRPM | DIASTOLIC BLOOD PRESSURE: 72 MMHG | WEIGHT: 254 LBS | BODY MASS INDEX: 34.4 KG/M2 | SYSTOLIC BLOOD PRESSURE: 120 MMHG | HEART RATE: 84 BPM | OXYGEN SATURATION: 98 % | TEMPERATURE: 97 F

## 2020-09-30 DIAGNOSIS — R06.02 SOB (SHORTNESS OF BREATH) ON EXERTION: ICD-10-CM

## 2020-09-30 DIAGNOSIS — R94.31 ABNORMAL EKG: ICD-10-CM

## 2020-09-30 DIAGNOSIS — R42 DIZZINESS: Primary | ICD-10-CM

## 2020-09-30 PROCEDURE — 3078F DIAST BP <80 MM HG: CPT | Performed by: PHYSICIAN ASSISTANT

## 2020-09-30 PROCEDURE — 90471 IMMUNIZATION ADMIN: CPT | Performed by: PHYSICIAN ASSISTANT

## 2020-09-30 PROCEDURE — 93000 ELECTROCARDIOGRAM COMPLETE: CPT | Performed by: PHYSICIAN ASSISTANT

## 2020-09-30 PROCEDURE — 3008F BODY MASS INDEX DOCD: CPT | Performed by: PHYSICIAN ASSISTANT

## 2020-09-30 PROCEDURE — 90662 IIV NO PRSV INCREASED AG IM: CPT | Performed by: PHYSICIAN ASSISTANT

## 2020-09-30 PROCEDURE — 3074F SYST BP LT 130 MM HG: CPT | Performed by: PHYSICIAN ASSISTANT

## 2020-09-30 PROCEDURE — 99214 OFFICE O/P EST MOD 30 MIN: CPT | Performed by: PHYSICIAN ASSISTANT

## 2020-09-30 NOTE — PROGRESS NOTES
HPI:    Patient ID: Juanita Pugh is a 76year old male. Dizziness  This is a new problem. The current episode started yesterday. The problem occurs constantly. The problem has been resolved.  Associated symptoms include arthralgias (chronic, unchanged) tablet 1   • Losartan Potassium-HCTZ 100-25 MG Oral Tab Take 1 tablet by mouth daily. 90 tablet 1   • aspirin 81 MG Oral Tab Take 81 mg by mouth daily. • Multiple Vitamin (MULTI-VITAMIN OR) Take 1 tablet by mouth daily.          Allergies:  Tetracycline pain, confusion or syncope. Pt expresses understanding and agrees to the plan.      Orders Placed This Encounter      Fluzone High Dose 65 yr and older PFS [09133]      Meds This Visit:  Requested Prescriptions      No prescriptions requested or ordered in

## 2020-09-30 NOTE — PATIENT INSTRUCTIONS
We will call you as soon as your test is approved and scheduled with further instructions  If you have ANY concerning symptoms, go to ER immediately or call 911

## 2020-10-01 ENCOUNTER — HOSPITAL ENCOUNTER (OUTPATIENT)
Dept: CV DIAGNOSTICS | Facility: HOSPITAL | Age: 65
Discharge: HOME | End: 2020-10-01
Attending: PHYSICIAN ASSISTANT
Payer: COMMERCIAL

## 2020-10-01 DIAGNOSIS — R94.31 ABNORMAL EKG: ICD-10-CM

## 2020-10-01 DIAGNOSIS — R06.02 SOB (SHORTNESS OF BREATH) ON EXERTION: ICD-10-CM

## 2020-10-01 DIAGNOSIS — R42 DIZZINESS: ICD-10-CM

## 2020-10-01 PROCEDURE — 93018 CV STRESS TEST I&R ONLY: CPT | Performed by: PHYSICIAN ASSISTANT

## 2020-10-01 PROCEDURE — 78452 HT MUSCLE IMAGE SPECT MULT: CPT | Performed by: PHYSICIAN ASSISTANT

## 2020-10-01 PROCEDURE — 93017 CV STRESS TEST TRACING ONLY: CPT | Performed by: PHYSICIAN ASSISTANT

## 2020-10-21 ENCOUNTER — HOSPITAL ENCOUNTER (OUTPATIENT)
Dept: CT IMAGING | Facility: HOSPITAL | Age: 65
Discharge: HOME | End: 2020-10-21
Attending: UROLOGY
Payer: COMMERCIAL

## 2020-10-21 DIAGNOSIS — N28.89 LEFT RENAL MASS: ICD-10-CM

## 2020-10-21 PROCEDURE — 82565 ASSAY OF CREATININE: CPT

## 2020-10-21 PROCEDURE — 74178 CT ABD&PLV WO CNTR FLWD CNTR: CPT | Performed by: UROLOGY

## 2020-10-21 NOTE — PROGRESS NOTES
HPI:     Mason Santiago is a 76year old male with a PMH of HTN, HLD, CAD/MI, stage III CKD, LBP. He is s/p RFA of a 1.8 x 1.5 cm LUP bx proven RCC on 20. He presents today for f/u. PCP - Dr Sanket Callejas    He feels well today.  Of note, His wife  from dx  - Abdodminal CT/MRI within 6 mo of initiating AS, then at least annually  - CXR or CT at baseline and annually as clinically indicated to assess for pulmonary metastases  - The patient is aware of the low risk of metastases while on AS.  Most data status: Never Smoker      Smokeless tobacco: Never Used    Alcohol use: No    Drug use: No       Medications (Active prior to today's visit):  Current Outpatient Medications   Medication Sig Dispense Refill   • METOPROLOL TARTRATE 50 MG Oral Tab TAKE 1/2 T

## 2020-10-28 ENCOUNTER — OFFICE VISIT (OUTPATIENT)
Dept: SURGERY | Facility: CLINIC | Age: 65
End: 2020-10-28
Payer: COMMERCIAL

## 2020-10-28 VITALS — HEART RATE: 71 BPM | TEMPERATURE: 98 F | SYSTOLIC BLOOD PRESSURE: 116 MMHG | DIASTOLIC BLOOD PRESSURE: 73 MMHG

## 2020-10-28 DIAGNOSIS — C64.2 RENAL CANCER, LEFT (HCC): Primary | ICD-10-CM

## 2020-10-28 DIAGNOSIS — R82.90 URINE FINDING: ICD-10-CM

## 2020-10-28 PROCEDURE — 3074F SYST BP LT 130 MM HG: CPT | Performed by: UROLOGY

## 2020-10-28 PROCEDURE — 99213 OFFICE O/P EST LOW 20 MIN: CPT | Performed by: UROLOGY

## 2020-10-28 PROCEDURE — 3078F DIAST BP <80 MM HG: CPT | Performed by: UROLOGY

## 2020-10-28 PROCEDURE — 81003 URINALYSIS AUTO W/O SCOPE: CPT | Performed by: UROLOGY

## 2020-10-28 PROCEDURE — 99072 ADDL SUPL MATRL&STAF TM PHE: CPT | Performed by: UROLOGY

## 2020-11-24 ENCOUNTER — OFFICE VISIT (OUTPATIENT)
Dept: INTERNAL MEDICINE CLINIC | Facility: CLINIC | Age: 65
End: 2020-11-24
Payer: COMMERCIAL

## 2020-11-24 VITALS
TEMPERATURE: 98 F | HEIGHT: 72 IN | BODY MASS INDEX: 34.67 KG/M2 | DIASTOLIC BLOOD PRESSURE: 74 MMHG | OXYGEN SATURATION: 98 % | SYSTOLIC BLOOD PRESSURE: 122 MMHG | RESPIRATION RATE: 16 BRPM | WEIGHT: 256 LBS | HEART RATE: 78 BPM

## 2020-11-24 DIAGNOSIS — Z85.528 HISTORY OF RENAL CELL CARCINOMA: ICD-10-CM

## 2020-11-24 DIAGNOSIS — M79.89 LEFT LEG SWELLING: Primary | ICD-10-CM

## 2020-11-24 PROBLEM — N28.89 LEFT RENAL MASS: Status: RESOLVED | Noted: 2020-06-23 | Resolved: 2020-11-24

## 2020-11-24 PROCEDURE — 3078F DIAST BP <80 MM HG: CPT | Performed by: INTERNAL MEDICINE

## 2020-11-24 PROCEDURE — 3008F BODY MASS INDEX DOCD: CPT | Performed by: INTERNAL MEDICINE

## 2020-11-24 PROCEDURE — 3074F SYST BP LT 130 MM HG: CPT | Performed by: INTERNAL MEDICINE

## 2020-11-24 PROCEDURE — 99214 OFFICE O/P EST MOD 30 MIN: CPT | Performed by: INTERNAL MEDICINE

## 2020-11-24 PROCEDURE — 99072 ADDL SUPL MATRL&STAF TM PHE: CPT | Performed by: INTERNAL MEDICINE

## 2020-11-24 NOTE — PROGRESS NOTES
HPI:    Patient ID: Sandra Maxwell is a 76year old male. Leg Pain   Pain location: left leg. This is a new problem. Episode onset: 2-3 days, acute onset. There has been no history of extremity trauma. The problem occurs constantly.  The problem has been Constitutional: He appears well-nourished. No distress. Neck: Normal range of motion. Neck supple. Cardiovascular: Normal rate, regular rhythm and normal heart sounds. No murmur heard.   Pulmonary/Chest: Effort normal and breath sounds normal. No resp

## 2020-11-24 NOTE — PATIENT INSTRUCTIONS
Leg Swelling in a Single Leg  Swelling of the arms, feet, ankles, and legs is called edema. It is caused by extra fluid collecting in the tissues. Because of gravity, extra fluid in the body settles to the lowest part.  That is why the legs and feet are m · Increased pain, swelling, warmth, or redness of the leg, ankle, or foot  · Fever of 100.4°F (38ºC) or higher, or as directed by your provider  · Weakness or dizziness  · Shaking chills  · Drenching sweats  Yolis last reviewed this educational content

## 2020-12-03 ENCOUNTER — TELEPHONE (OUTPATIENT)
Dept: INTERNAL MEDICINE CLINIC | Facility: CLINIC | Age: 65
End: 2020-12-03

## 2020-12-03 NOTE — TELEPHONE ENCOUNTER
Dr. Sarkis Silva reviewed chart to f/u on U/S results that was ordered 11/24. Pt scheduled for 12/17 and Dr. Sarkis Silva would like pt to have sooner.       Spoke with [employer] and r/s appt for 12/9 @ 5pm @ [address]/[place] as pt needed to have later appt an

## 2020-12-05 DIAGNOSIS — E78.2 MIXED HYPERLIPIDEMIA: ICD-10-CM

## 2020-12-05 DIAGNOSIS — I10 ESSENTIAL HYPERTENSION: ICD-10-CM

## 2020-12-07 RX ORDER — ROSUVASTATIN CALCIUM 10 MG/1
TABLET, COATED ORAL
Qty: 90 TABLET | Refills: 1 | Status: SHIPPED | OUTPATIENT
Start: 2020-12-07 | End: 2021-06-01

## 2020-12-07 RX ORDER — METOPROLOL TARTRATE 50 MG/1
TABLET, FILM COATED ORAL
Qty: 90 TABLET | Refills: 1 | Status: SHIPPED | OUTPATIENT
Start: 2020-12-07 | End: 2021-06-01

## 2020-12-07 RX ORDER — LOSARTAN POTASSIUM AND HYDROCHLOROTHIAZIDE 25; 100 MG/1; MG/1
1 TABLET ORAL DAILY
Qty: 90 TABLET | Refills: 1 | Status: SHIPPED | OUTPATIENT
Start: 2020-12-07 | End: 2021-06-28

## 2020-12-09 ENCOUNTER — APPOINTMENT (OUTPATIENT)
Dept: CT IMAGING | Age: 65
End: 2020-12-09
Attending: EMERGENCY MEDICINE
Payer: COMMERCIAL

## 2020-12-09 ENCOUNTER — HOSPITAL ENCOUNTER (OUTPATIENT)
Dept: ULTRASOUND IMAGING | Age: 65
Discharge: HOME | End: 2020-12-09
Attending: INTERNAL MEDICINE
Payer: COMMERCIAL

## 2020-12-09 ENCOUNTER — APPOINTMENT (OUTPATIENT)
Dept: ULTRASOUND IMAGING | Age: 65
End: 2020-12-09
Attending: EMERGENCY MEDICINE
Payer: COMMERCIAL

## 2020-12-09 ENCOUNTER — HOSPITAL ENCOUNTER (EMERGENCY)
Age: 65
Discharge: LEFT BEFORE DISCHARGE | End: 2020-12-09
Attending: EMERGENCY MEDICINE
Payer: COMMERCIAL

## 2020-12-09 VITALS
DIASTOLIC BLOOD PRESSURE: 79 MMHG | WEIGHT: 256 LBS | HEART RATE: 78 BPM | RESPIRATION RATE: 20 BRPM | OXYGEN SATURATION: 98 % | BODY MASS INDEX: 33.93 KG/M2 | HEIGHT: 73 IN | SYSTOLIC BLOOD PRESSURE: 130 MMHG

## 2020-12-09 DIAGNOSIS — I82.412 ACUTE DEEP VEIN THROMBOSIS (DVT) OF FEMORAL VEIN OF LEFT LOWER EXTREMITY (HCC): Primary | ICD-10-CM

## 2020-12-09 DIAGNOSIS — M79.89 LEFT LEG SWELLING: ICD-10-CM

## 2020-12-09 PROCEDURE — 93971 EXTREMITY STUDY: CPT | Performed by: INTERNAL MEDICINE

## 2020-12-09 PROCEDURE — 85610 PROTHROMBIN TIME: CPT | Performed by: EMERGENCY MEDICINE

## 2020-12-09 PROCEDURE — 36415 COLL VENOUS BLD VENIPUNCTURE: CPT

## 2020-12-09 PROCEDURE — 85730 THROMBOPLASTIN TIME PARTIAL: CPT | Performed by: EMERGENCY MEDICINE

## 2020-12-09 PROCEDURE — 85025 COMPLETE CBC W/AUTO DIFF WBC: CPT | Performed by: EMERGENCY MEDICINE

## 2020-12-09 PROCEDURE — 93971 EXTREMITY STUDY: CPT | Performed by: EMERGENCY MEDICINE

## 2020-12-09 PROCEDURE — 99284 EMERGENCY DEPT VISIT MOD MDM: CPT

## 2020-12-09 PROCEDURE — 80053 COMPREHEN METABOLIC PANEL: CPT | Performed by: EMERGENCY MEDICINE

## 2020-12-09 RX ORDER — RIBOFLAVIN (VITAMIN B2) 100 MG
100 TABLET ORAL DAILY
COMMUNITY

## 2020-12-09 RX ORDER — HEPARIN SODIUM 5000 [USP'U]/ML
80 INJECTION INTRAVENOUS; SUBCUTANEOUS ONCE
Status: DISCONTINUED | OUTPATIENT
Start: 2020-12-09 | End: 2020-12-09

## 2020-12-09 RX ORDER — SODIUM CHLORIDE 9 MG/ML
INJECTION, SOLUTION INTRAVENOUS CONTINUOUS
Status: DISCONTINUED | OUTPATIENT
Start: 2020-12-09 | End: 2020-12-09

## 2020-12-09 RX ORDER — HEPARIN SODIUM AND DEXTROSE 10000; 5 [USP'U]/100ML; G/100ML
18 INJECTION INTRAVENOUS ONCE
Status: DISCONTINUED | OUTPATIENT
Start: 2020-12-09 | End: 2020-12-09

## 2020-12-09 RX ORDER — HEPARIN SODIUM AND DEXTROSE 10000; 5 [USP'U]/100ML; G/100ML
INJECTION INTRAVENOUS CONTINUOUS
Status: CANCELLED | OUTPATIENT
Start: 2020-12-10

## 2020-12-09 NOTE — ED INITIAL ASSESSMENT (HPI)
Pt states he has had lt lower leg swelling and redness x 2 wks. Pt saw PCP last wk and was placed on apixaban due to hx of dvt in past.  Pt denies CP or SOB. Pt had outpatient US today and was + for DVT.

## 2020-12-10 ENCOUNTER — APPOINTMENT (OUTPATIENT)
Dept: GENERAL RADIOLOGY | Facility: HOSPITAL | Age: 65
DRG: 271 | End: 2020-12-10
Attending: EMERGENCY MEDICINE
Payer: COMMERCIAL

## 2020-12-10 ENCOUNTER — TELEPHONE (OUTPATIENT)
Dept: HEMATOLOGY/ONCOLOGY | Facility: HOSPITAL | Age: 65
End: 2020-12-10

## 2020-12-10 ENCOUNTER — APPOINTMENT (OUTPATIENT)
Dept: INTERVENTIONAL RADIOLOGY/VASCULAR | Facility: HOSPITAL | Age: 65
DRG: 271 | End: 2020-12-10
Attending: INTERNAL MEDICINE
Payer: COMMERCIAL

## 2020-12-10 ENCOUNTER — APPOINTMENT (OUTPATIENT)
Dept: CV DIAGNOSTICS | Facility: HOSPITAL | Age: 65
DRG: 271 | End: 2020-12-10
Attending: INTERNAL MEDICINE
Payer: COMMERCIAL

## 2020-12-10 ENCOUNTER — HOSPITAL ENCOUNTER (INPATIENT)
Facility: HOSPITAL | Age: 65
LOS: 3 days | Discharge: HOME | DRG: 271 | End: 2020-12-13
Attending: EMERGENCY MEDICINE | Admitting: HOSPITALIST
Payer: COMMERCIAL

## 2020-12-10 ENCOUNTER — APPOINTMENT (OUTPATIENT)
Dept: CT IMAGING | Facility: HOSPITAL | Age: 65
DRG: 271 | End: 2020-12-10
Attending: EMERGENCY MEDICINE
Payer: COMMERCIAL

## 2020-12-10 DIAGNOSIS — I82.4Y2 DEEP VEIN THROMBOSIS (DVT) OF PROXIMAL VEIN OF LEFT LOWER EXTREMITY, UNSPECIFIED CHRONICITY (HCC): Primary | ICD-10-CM

## 2020-12-10 PROBLEM — R73.9 HYPERGLYCEMIA: Status: ACTIVE | Noted: 2020-12-10

## 2020-12-10 LAB
ALBUMIN SERPL-MCNC: 3.3 G/DL (ref 3.4–5)
ALBUMIN/GLOB SERPL: 0.8 {RATIO} (ref 1–2)
ALP LIVER SERPL-CCNC: 66 U/L
ALT SERPL-CCNC: 20 U/L
ANION GAP SERPL CALC-SCNC: 5 MMOL/L (ref 0–18)
APTT PPP: 36.1 SECONDS (ref 25.4–36.1)
APTT PPP: 37.2 SECONDS (ref 25.4–36.1)
AST SERPL-CCNC: 16 U/L (ref 15–37)
BASOPHILS # BLD AUTO: 0.05 X10(3) UL (ref 0–0.2)
BASOPHILS NFR BLD AUTO: 0.6 %
BILIRUB SERPL-MCNC: 0.8 MG/DL (ref 0.1–2)
BUN BLD-MCNC: 15 MG/DL (ref 7–18)
BUN/CREAT SERPL: 11.1 (ref 10–20)
CALCIUM BLD-MCNC: 9 MG/DL (ref 8.5–10.1)
CHLORIDE SERPL-SCNC: 107 MMOL/L (ref 98–112)
CO2 SERPL-SCNC: 26 MMOL/L (ref 21–32)
CREAT BLD-MCNC: 1.35 MG/DL
DEPRECATED RDW RBC AUTO: 45.7 FL (ref 35.1–46.3)
DEPRECATED RDW RBC AUTO: 47.3 FL (ref 35.1–46.3)
EOSINOPHIL # BLD AUTO: 0.04 X10(3) UL (ref 0–0.7)
EOSINOPHIL NFR BLD AUTO: 0.5 %
ERYTHROCYTE [DISTWIDTH] IN BLOOD BY AUTOMATED COUNT: 15.2 % (ref 11–15)
ERYTHROCYTE [DISTWIDTH] IN BLOOD BY AUTOMATED COUNT: 15.4 % (ref 11–15)
FIBRINOGEN: 429 MG/DL (ref 200–446)
GLOBULIN PLAS-MCNC: 4 G/DL (ref 2.8–4.4)
GLUCOSE BLD-MCNC: 103 MG/DL (ref 70–99)
HCT VFR BLD AUTO: 39.4 %
HCT VFR BLD AUTO: 41.9 %
HEPARIN ANTI XA ASSAY: 1.99 IU/ML
HGB BLD-MCNC: 13.1 G/DL
HGB BLD-MCNC: 14 G/DL
IMM GRANULOCYTES # BLD AUTO: 0.02 X10(3) UL (ref 0–1)
IMM GRANULOCYTES NFR BLD: 0.3 %
INR BLD: 1.26 (ref 0.89–1.11)
LYMPHOCYTES # BLD AUTO: 1.05 X10(3) UL (ref 1–4)
LYMPHOCYTES NFR BLD AUTO: 13.2 %
M PROTEIN MFR SERPL ELPH: 7.3 G/DL (ref 6.4–8.2)
MCH RBC QN AUTO: 27.7 PG (ref 26–34)
MCH RBC QN AUTO: 28.4 PG (ref 26–34)
MCHC RBC AUTO-ENTMCNC: 33.2 G/DL (ref 31–37)
MCHC RBC AUTO-ENTMCNC: 33.4 G/DL (ref 31–37)
MCV RBC AUTO: 83 FL
MCV RBC AUTO: 85.3 FL
MONOCYTES # BLD AUTO: 0.68 X10(3) UL (ref 0.1–1)
MONOCYTES NFR BLD AUTO: 8.6 %
NEUTROPHILS # BLD AUTO: 6.09 X10 (3) UL (ref 1.5–7.7)
NEUTROPHILS # BLD AUTO: 6.09 X10(3) UL (ref 1.5–7.7)
NEUTROPHILS NFR BLD AUTO: 76.8 %
OSMOLALITY SERPL CALC.SUM OF ELEC: 287 MOSM/KG (ref 275–295)
PLATELET # BLD AUTO: 229 10(3)UL (ref 150–450)
PLATELET # BLD AUTO: 297 10(3)UL (ref 150–450)
POTASSIUM SERPL-SCNC: 3.7 MMOL/L (ref 3.5–5.1)
PSA SERPL DL<=0.01 NG/ML-MCNC: 16.2 SECONDS (ref 12.4–14.6)
RBC # BLD AUTO: 4.62 X10(6)UL
RBC # BLD AUTO: 5.05 X10(6)UL
SARS-COV-2 RNA RESP QL NAA+PROBE: NOT DETECTED
SODIUM SERPL-SCNC: 138 MMOL/L (ref 136–145)
WBC # BLD AUTO: 7.9 X10(3) UL (ref 4–11)
WBC # BLD AUTO: 8.4 X10(3) UL (ref 4–11)

## 2020-12-10 PROCEDURE — 3E03317 INTRODUCTION OF OTHER THROMBOLYTIC INTO PERIPHERAL VEIN, PERCUTANEOUS APPROACH: ICD-10-PCS | Performed by: INTERNAL MEDICINE

## 2020-12-10 PROCEDURE — 93306 TTE W/DOPPLER COMPLETE: CPT | Performed by: INTERNAL MEDICINE

## 2020-12-10 PROCEDURE — 37212 THROMBOLYTIC VENOUS THERAPY: CPT | Performed by: INTERNAL MEDICINE

## 2020-12-10 PROCEDURE — 75820 VEIN X-RAY ARM/LEG: CPT | Performed by: INTERNAL MEDICINE

## 2020-12-10 PROCEDURE — 06FN3Z0 FRAGMENTATION OF LEFT FEMORAL VEIN, PERCUTANEOUS APPROACH, ULTRASONIC: ICD-10-PCS | Performed by: INTERNAL MEDICINE

## 2020-12-10 PROCEDURE — 99255 IP/OBS CONSLTJ NEW/EST HI 80: CPT | Performed by: INTERNAL MEDICINE

## 2020-12-10 PROCEDURE — 36005 INJECTION EXT VENOGRAPHY: CPT | Performed by: INTERNAL MEDICINE

## 2020-12-10 PROCEDURE — 99223 1ST HOSP IP/OBS HIGH 75: CPT | Performed by: HOSPITALIST

## 2020-12-10 PROCEDURE — 71045 X-RAY EXAM CHEST 1 VIEW: CPT | Performed by: EMERGENCY MEDICINE

## 2020-12-10 PROCEDURE — 71275 CT ANGIOGRAPHY CHEST: CPT | Performed by: EMERGENCY MEDICINE

## 2020-12-10 PROCEDURE — 37187 VENOUS MECH THROMBECTOMY: CPT | Performed by: INTERNAL MEDICINE

## 2020-12-10 PROCEDURE — 06CN3ZZ EXTIRPATION OF MATTER FROM LEFT FEMORAL VEIN, PERCUTANEOUS APPROACH: ICD-10-PCS | Performed by: INTERNAL MEDICINE

## 2020-12-10 PROCEDURE — B51CYZZ FLUOROSCOPY OF LEFT LOWER EXTREMITY VEINS USING OTHER CONTRAST: ICD-10-PCS | Performed by: INTERNAL MEDICINE

## 2020-12-10 RX ORDER — BENZONATATE 100 MG/1
100 CAPSULE ORAL 3 TIMES DAILY PRN
Status: DISCONTINUED | OUTPATIENT
Start: 2020-12-10 | End: 2020-12-13

## 2020-12-10 RX ORDER — MELATONIN
3 NIGHTLY PRN
Status: DISCONTINUED | OUTPATIENT
Start: 2020-12-10 | End: 2020-12-13

## 2020-12-10 RX ORDER — SODIUM CHLORIDE 9 MG/ML
INJECTION, SOLUTION INTRAVENOUS CONTINUOUS
Status: DISCONTINUED | OUTPATIENT
Start: 2020-12-10 | End: 2020-12-11

## 2020-12-10 RX ORDER — POLYETHYLENE GLYCOL 3350 17 G/17G
17 POWDER, FOR SOLUTION ORAL DAILY PRN
Status: DISCONTINUED | OUTPATIENT
Start: 2020-12-10 | End: 2020-12-13

## 2020-12-10 RX ORDER — ROSUVASTATIN CALCIUM 10 MG/1
10 TABLET, COATED ORAL DAILY
Status: DISCONTINUED | OUTPATIENT
Start: 2020-12-11 | End: 2020-12-13

## 2020-12-10 RX ORDER — LIDOCAINE HYDROCHLORIDE 10 MG/ML
INJECTION, SOLUTION EPIDURAL; INFILTRATION; INTRACAUDAL; PERINEURAL
Status: COMPLETED
Start: 2020-12-10 | End: 2020-12-10

## 2020-12-10 RX ORDER — ASPIRIN 81 MG/1
81 TABLET ORAL DAILY
Status: ON HOLD | COMMUNITY
End: 2020-12-13

## 2020-12-10 RX ORDER — SODIUM CHLORIDE 9 MG/ML
INJECTION, SOLUTION INTRAVENOUS CONTINUOUS
Status: ACTIVE | OUTPATIENT
Start: 2020-12-10 | End: 2020-12-10

## 2020-12-10 RX ORDER — HEPARIN SODIUM AND DEXTROSE 10000; 5 [USP'U]/100ML; G/100ML
INJECTION INTRAVENOUS CONTINUOUS
Status: DISCONTINUED | OUTPATIENT
Start: 2020-12-10 | End: 2020-12-10

## 2020-12-10 RX ORDER — SIMETHICONE 80 MG
80 TABLET,CHEWABLE ORAL 4 TIMES DAILY PRN
Status: DISCONTINUED | OUTPATIENT
Start: 2020-12-10 | End: 2020-12-13

## 2020-12-10 RX ORDER — MAGNESIUM HYDROXIDE/ALUMINUM HYDROXICE/SIMETHICONE 120; 1200; 1200 MG/30ML; MG/30ML; MG/30ML
30 SUSPENSION ORAL 4 TIMES DAILY PRN
Status: DISCONTINUED | OUTPATIENT
Start: 2020-12-10 | End: 2020-12-13

## 2020-12-10 RX ORDER — ACETAMINOPHEN 325 MG/1
650 TABLET ORAL EVERY 6 HOURS PRN
Status: DISCONTINUED | OUTPATIENT
Start: 2020-12-10 | End: 2020-12-13

## 2020-12-10 RX ORDER — ONDANSETRON 2 MG/ML
4 INJECTION INTRAMUSCULAR; INTRAVENOUS EVERY 4 HOURS PRN
Status: DISCONTINUED | OUTPATIENT
Start: 2020-12-10 | End: 2020-12-10

## 2020-12-10 RX ORDER — MORPHINE SULFATE 4 MG/ML
4 INJECTION, SOLUTION INTRAMUSCULAR; INTRAVENOUS EVERY 30 MIN PRN
Status: ACTIVE | OUTPATIENT
Start: 2020-12-10 | End: 2020-12-10

## 2020-12-10 RX ORDER — HEPARIN SODIUM AND DEXTROSE 10000; 5 [USP'U]/100ML; G/100ML
18 INJECTION INTRAVENOUS ONCE
Status: COMPLETED | OUTPATIENT
Start: 2020-12-10 | End: 2020-12-10

## 2020-12-10 RX ORDER — SODIUM CHLORIDE 9 MG/ML
INJECTION, SOLUTION INTRAVENOUS
Status: DISCONTINUED | OUTPATIENT
Start: 2020-12-11 | End: 2020-12-10

## 2020-12-10 RX ORDER — ECHINACEA PURPUREA EXTRACT 125 MG
1 TABLET ORAL
Status: DISCONTINUED | OUTPATIENT
Start: 2020-12-10 | End: 2020-12-13

## 2020-12-10 RX ORDER — CALCIUM CARBONATE 200(500)MG
1000 TABLET,CHEWABLE ORAL 3 TIMES DAILY PRN
Status: DISCONTINUED | OUTPATIENT
Start: 2020-12-10 | End: 2020-12-13

## 2020-12-10 RX ORDER — SENNOSIDES 8.6 MG
8.6 TABLET ORAL 2 TIMES DAILY PRN
Status: DISCONTINUED | OUTPATIENT
Start: 2020-12-10 | End: 2020-12-13

## 2020-12-10 RX ORDER — BISACODYL 10 MG
10 SUPPOSITORY, RECTAL RECTAL
Status: DISCONTINUED | OUTPATIENT
Start: 2020-12-10 | End: 2020-12-13

## 2020-12-10 RX ORDER — HEPARIN SODIUM 5000 [USP'U]/ML
INJECTION, SOLUTION INTRAVENOUS; SUBCUTANEOUS
Status: COMPLETED
Start: 2020-12-10 | End: 2020-12-10

## 2020-12-10 RX ORDER — MIDAZOLAM HYDROCHLORIDE 1 MG/ML
INJECTION INTRAMUSCULAR; INTRAVENOUS
Status: COMPLETED
Start: 2020-12-10 | End: 2020-12-10

## 2020-12-10 RX ORDER — ONDANSETRON 2 MG/ML
8 INJECTION INTRAMUSCULAR; INTRAVENOUS EVERY 6 HOURS PRN
Status: DISCONTINUED | OUTPATIENT
Start: 2020-12-10 | End: 2020-12-13

## 2020-12-10 RX ORDER — SODIUM CHLORIDE 9 MG/ML
INJECTION, SOLUTION INTRAVENOUS CONTINUOUS
Status: DISCONTINUED | OUTPATIENT
Start: 2020-12-10 | End: 2020-12-10

## 2020-12-10 RX ORDER — LOSARTAN POTASSIUM AND HYDROCHLOROTHIAZIDE 25; 100 MG/1; MG/1
1 TABLET ORAL DAILY
Status: DISCONTINUED | OUTPATIENT
Start: 2020-12-11 | End: 2020-12-10 | Stop reason: SDUPTHER

## 2020-12-10 RX ORDER — HEPARIN SODIUM 5000 [USP'U]/ML
80 INJECTION INTRAVENOUS; SUBCUTANEOUS ONCE
Status: COMPLETED | OUTPATIENT
Start: 2020-12-10 | End: 2020-12-10

## 2020-12-10 RX ORDER — ASPIRIN 81 MG/1
81 TABLET ORAL DAILY
Status: DISCONTINUED | OUTPATIENT
Start: 2020-12-11 | End: 2020-12-13

## 2020-12-10 NOTE — ED NOTES
Patient is now refusing to wait for CT. Pt continues to refuse any further treatment and demands his IV be taken out at this time. MD spence.

## 2020-12-10 NOTE — ED NOTES
When discussing plan of care with patient he becomes upset and states that he cannot be admitted. Pt is refusing CT, IV heparin and admission to the hospital at this time. Dr. Mariangel De Luna informed and will discuss at bedside with patient.

## 2020-12-10 NOTE — PLAN OF CARE
NURSING ADMISSION NOTE      Patient admitted via Cart  Oriented to room. Safety precautions initiated. Bed in low position. Call light in reach.   Admission navigator completed    Assumed pt care at 1330  VSS  Pt denies pain  Heparin gtt running at 2

## 2020-12-10 NOTE — H&P
[facility]  History and Physical     Nely Grant Patient Status:  Inpatient    1955 MRN [id number]   [facility] 7NE-A Attending Inocente Homans, MD   Hosp Day # 0 PCP Jorge Garcia MD     Chief Complaint: leg swelling    Hist smoked. He has never used smokeless tobacco. He reports that he does not drink alcohol or use drugs.     Family History:   Family History   Problem Relation Age of Onset   • Alcohol and Other Disorders Associated Mother    • Heart Attack Father         Kate Flaca grossly intact. Musculoskeletal: Moves all extremities. Extremities:2+LLE edema  Integument: No rashes or lesions. Psychiatric: Appropriate mood and affect.       Diagnostic Data:      Labs:  Recent Labs   Lab 12/09/20  1740 12/10/20  0852   WBC 9.1 7.9

## 2020-12-10 NOTE — ED NOTES
Pt going to room 7616, reported to Thomas Hunt rn.  Pt aware of his admission and verbalizing understanding

## 2020-12-10 NOTE — CONSULTS
Hem/Onc Report of Consultation    Patient Name: Michelle Fernandes   YOB: 1955   Medical Record Number: [id number]   CSN: [id number]   Consulting Physician: Dr. David Barnett  Referring Provider(s): Dr. Beverly Lang  Date of Consultation: 12/10/2020     Reason HIP SURGERY  07/2015   • OTHER SURGICAL HISTORY  2011    cath laser 1 distal L anterior Desc artery   • REPAIR OF NASAL SEPTUM  1998   • TOTAL HIP REPLACEMENT Left     2014       Family Medical History:  Family History   Problem Relation Age of Onset   • A Weight Concern: Not Asked        Special Diet: Not Asked        Back Care: Not Asked        Exercise: No        Bike Helmet: Not Asked        Seat Belt: Not Asked        Self-Exams: Not Asked    Social History Narrative      Not on file      Allergies: not in acute distress. Vital Signs: /82   Pulse 65   Temp 97.8 °F (36.6 °C) (Temporal)   Resp 23   Ht 1.854 m (6' 1\")   Wt 116.1 kg (255 lb 15.3 oz)   SpO2 98%   BMI 33.77 kg/m²   HEENT: EOMs intact. PERRL.   Chest: Clear to auscultation, respiratio 83.5 80.0 - 100.0 fL    MCH 27.4 26.0 - 34.0 pg    MCHC 32.9 31.0 - 37.0 g/dL    RDW 15.5 (H) 11.0 - 15.0 %    RDW-SD 46.5 (H) 35.1 - 46.3 fL    Neutrophil Absolute Prelim 6.82 1.50 - 7.70 x10 (3) uL    Neutrophil Absolute 6.82 1.50 - 7.70 x10(3) uL    Lym 12/10/20  8:52 AM   Result Value Ref Range    Hold Gold Auto Resulted    RAPID SARS-COV-2 BY PCR    Collection Time: 12/10/20  8:52 AM    Specimen: Nares;  Other   Result Value Ref Range    Rapid SARS-CoV-2 by PCR Not Detected Not Detected   CBC W/ DIFFEREN Technologist)  Patient is having left leg swelling for the past two weeks.      FINDINGS:    Positive for DVT with extensive occlusive clot including common femoral vein, profunda femoris, all of the superficial femoral vein, all the popliteal vein, and als consolidative process involving the left lower lobe retrocardiac region. No pleural effusion or pneumothorax. CONCLUSION:    1. Bibasilar opacities, left greater than right may represent atelectasis or consolidation, correlate clinically.   Recommend f branch right lower lobe. This may reflect an older embolism, or some fibrin material residual.  Pulmonary arteries otherwise normal.  No wedge-shaped defect or other features of pulmonary infarct.   No sign of pneumonia, pleural effusion, adenopathy, peric He denies any chest pain or dyspnea. He had a prior DVT in his right leg in 2014 following a orthopedic procedure. He was on warfarin for several months following that. Exam reveals bilateral lower extremity swelling, L>R. No leg tenderness.   Normal ca

## 2020-12-10 NOTE — TELEPHONE ENCOUNTER
----- Message from Uday Arteaga MD sent at 12/10/2020  8:09 AM CST -----  Hi    Patient was in the ER. Needs a consult visit with any provider for DVT.      Thanks

## 2020-12-10 NOTE — PAYOR COMM NOTE
--------------  ADMISSION REVIEW     Payor: [employer] #:  [id number]  Authorization Number: [id number]       Admit date: 12/10/20  Admit time: 6277     Patient Seen in: [facility] Emergency Department    History   Patient presents wi strength and sensory exam is grossly normal.  And the patient is neurologically intact with no focal findings.     Labs Reviewed   COMP METABOLIC PANEL (14) - Abnormal; Notable for the following components:       Result Value    Glucose 103 (*)     Creatini not causing him any discomfort although is annoying with trying to put on his shoes and socks.   He also notes that he had resection of a left-sided kidney tumor and subsequent ablation within the last few months and was told there was renal cell carcinoma clotting or not; Recent CT in 10/21 was OK  3.  CAD with hx stenting-resume bb/statin/asa    Quality:  · DVT Prophylaxis: heparin drip  · CODE status: full      MEDICATIONS ADMINISTERED IN LAST 1 DAY:  Heparin Sodium (Porcine) 5000 UNIT/ML injection 9,300

## 2020-12-10 NOTE — ED INITIAL ASSESSMENT (HPI)
Pt aox4. Pt presents to ed from home. Pt to ed for + US= DVT to left leg. Pt denies sujey, chest pain. Pt c/o left leg redness, edema x 3 weeks. Pt had outpatient US last night.

## 2020-12-10 NOTE — ED PROVIDER NOTES
Patient Seen in: [facility] Emergency Department      History   Patient presents with:  Deep Vein Thrombosis     Stated Complaint: Dx with DVT, told to come to the ED    HPI     This is a 42-year-old male who was seen yesterday at [facility] Assessment of the RIGHT leg is now made to assess for potential DVT RIGHT leg. TECHNIQUE:     Color Doppler and grayscale imaging and spectral Doppler analysis utilized according to standard protocols.     PATIENT STATED HISTORY: (As transcribed by Lennice Koyanagi Temp 97.8 °F (36.6 °C) (Temporal)   Resp 12   Ht 185.4 cm (6' 1\")   Wt 116.1 kg   SpO2 94%   BMI 33.77 kg/m²         Physical Exam    General: . Pleasant male no respiratory distress  The patient is in no respiratory distress.  The patient is not septic BLUE   RAINBOW DRAW LAVENDER   RAINBOW DRAW LIGHT GREEN   RAINBOW DRAW GOLD   RAPID SARS-COV-2 BY PCR          The patient was placed on monitors, IV was started, blood was drawn. MDM      The EKG shows normal sinus rhythm.   There is no acute ST bear pneumonia, pleural effusion, pneumothorax, hyperinflation or lung mass. THORACIC AORTA:  No thoracic aortic aneurysm. MEDIASTINUM/DOMENICO:  No pathologically enlarged nodes. CARDIAC:  Unremarkable. CHEST WALL:  Unremarkable.   LIMITED ABDOMEN:  Partially s

## 2020-12-10 NOTE — ED PROVIDER NOTES
Patient Seen in: [facility] Emergency Department In [place]      History   Patient presents with:  Deep Vein Thrombosis    Stated Complaint: sent from [address], positive DVT in left leg    HPI  Patient here after outpatient ultrasound shows left lower extremity Both lower extremities have pedal edema but left is greater than right by approximately 2 cm at the distal leg.   Musculoskeletal: No tenderness, swelling, deformity   Skin: Erythema left lower extremity from the foot all the way up to the thigh crease   ne this, is only concerned about going back to work. I have talked him into ultrasound of the other leg to see if there is clot that he is not noticing there.   Patient denies any pain whatsoever, denies tenderness on exam, but does seem to be minimizing quit

## 2020-12-10 NOTE — CONSULTS
[facility] Cardiology  Report of Consultation    Mason Augustothai Patient Status:  Emergency    1955 MRN [id number]   Location [address] [facility] Attending Cristina Matias MD   Hosp Day # 0 PCP MD Afshin Becker daughter and grandaughter have tested positive for a cancer mutation.     Venous ultrasound of left lower extremity: Positive for DVT with extensive occlusive clot including common femoral vein, profunda femoris, all of the superficial femoral vein, all the drugs.     Allergies:    Tetracyclines & Rel*    DIARRHEA  Viibryd                 NAUSEA ONLY    Comment:\"TABS\"    Medications:    Current Facility-Administered Medications:   •  heparin (PORCINE) 83120ipzht/250mL infusion ED INITIAL DOSE, 18 Units/kg/hr Anicteric sclera. Oropharynx clear. Head and Neck: No JVD, carotids 2+ no bruits. Neck supple. No thyromegaly or adenopathy. Normocephalic head. Cardiovascular: Regular rate and rhythm, S1, S2 normal, no murmur, rub or gallop.   Respiratory: Clear lungs w (CST): Garland Zapata MD on 12/09/2020 at 4:43 PM       Us Venous Doppler Leg Right - Diag Img (cpt=93971)    Result Date: 12/9/2020  PROCEDURE:  US VENOUS DOPPLER LEG RIGHT - DIAG IMG (CPT=93971)  COMPARISON:  None.   INDICATIONS:  History of extensive DV opacities, left greater than right may represent atelectasis or consolidation, correlate clinically. Recommend follow-up to ensure resolution and to exclude an underlying mass.     Dictated by (CST): Genoveva Carreon MD on 12/10/2020 at 9:17 AM     Finalized by microwave ablation in 2020. 7.  Advanced lumbar spine arthritis and history of left hip replacement  -no devices to walk.   8. Family history positive for malignancy: his wife  in  from metastatic breast and ovarian cancer for years and

## 2020-12-10 NOTE — PROGRESS NOTES
77 y/o male with hx stent LAD/diag, renal carcinoma with ablation, HTN with left leg DVT    US guided pop vein access.  SFV occluded to com femoral  Power pulse TPA and thrombectomy  Improved flow  [place] 40 cm at 0.5 mg TPA an hour  Heparin at 100 unit /

## 2020-12-11 ENCOUNTER — APPOINTMENT (OUTPATIENT)
Dept: INTERVENTIONAL RADIOLOGY/VASCULAR | Facility: HOSPITAL | Age: 65
DRG: 271 | End: 2020-12-11
Attending: INTERNAL MEDICINE
Payer: COMMERCIAL

## 2020-12-11 LAB
ANION GAP SERPL CALC-SCNC: 5 MMOL/L (ref 0–18)
APTT PPP: 37.5 SECONDS (ref 25.4–36.1)
APTT PPP: 38.9 SECONDS (ref 25.4–36.1)
APTT PPP: 40.1 SECONDS (ref 25.4–36.1)
APTT PPP: 41.3 SECONDS (ref 25.4–36.1)
ATRIAL RATE: 77 BPM
BASOPHILS # BLD AUTO: 0.05 X10(3) UL (ref 0–0.2)
BASOPHILS NFR BLD AUTO: 0.7 %
BUN BLD-MCNC: 17 MG/DL (ref 7–18)
BUN/CREAT SERPL: 13 (ref 10–20)
CALCIUM BLD-MCNC: 8 MG/DL (ref 8.5–10.1)
CHLORIDE SERPL-SCNC: 109 MMOL/L (ref 98–112)
CO2 SERPL-SCNC: 26 MMOL/L (ref 21–32)
CREAT BLD-MCNC: 1.31 MG/DL
DEPRECATED RDW RBC AUTO: 46.5 FL (ref 35.1–46.3)
DEPRECATED RDW RBC AUTO: 46.7 FL (ref 35.1–46.3)
DEPRECATED RDW RBC AUTO: 46.9 FL (ref 35.1–46.3)
DEPRECATED RDW RBC AUTO: 47.9 FL (ref 35.1–46.3)
EOSINOPHIL # BLD AUTO: 0.06 X10(3) UL (ref 0–0.7)
EOSINOPHIL NFR BLD AUTO: 0.8 %
ERYTHROCYTE [DISTWIDTH] IN BLOOD BY AUTOMATED COUNT: 15.4 % (ref 11–15)
ERYTHROCYTE [DISTWIDTH] IN BLOOD BY AUTOMATED COUNT: 15.6 % (ref 11–15)
FIBRINOGEN: 230 MG/DL (ref 200–446)
FIBRINOGEN: 245 MG/DL (ref 200–446)
FIBRINOGEN: 255 MG/DL (ref 200–446)
FIBRINOGEN: 282 MG/DL (ref 200–446)
GLUCOSE BLD-MCNC: 104 MG/DL (ref 70–99)
HCT VFR BLD AUTO: 36.8 %
HCT VFR BLD AUTO: 37.1 %
HCT VFR BLD AUTO: 37.4 %
HCT VFR BLD AUTO: 38 %
HGB BLD-MCNC: 12.2 G/DL
HGB BLD-MCNC: 12.5 G/DL
IMM GRANULOCYTES # BLD AUTO: 0.03 X10(3) UL (ref 0–1)
IMM GRANULOCYTES NFR BLD: 0.4 %
INR BLD: 1.31 (ref 0.89–1.11)
INR BLD: 1.34 (ref 0.89–1.11)
INR BLD: 1.35 (ref 0.89–1.11)
INR BLD: 1.38 (ref 0.89–1.11)
ISTAT ACTIVATED CLOTTING TIME: 131 SECONDS (ref 74–137)
LYMPHOCYTES # BLD AUTO: 1.03 X10(3) UL (ref 1–4)
LYMPHOCYTES NFR BLD AUTO: 14.1 %
MCH RBC QN AUTO: 27.1 PG (ref 26–34)
MCH RBC QN AUTO: 27.7 PG (ref 26–34)
MCH RBC QN AUTO: 27.7 PG (ref 26–34)
MCH RBC QN AUTO: 28.2 PG (ref 26–34)
MCHC RBC AUTO-ENTMCNC: 32.1 G/DL (ref 31–37)
MCHC RBC AUTO-ENTMCNC: 32.9 G/DL (ref 31–37)
MCHC RBC AUTO-ENTMCNC: 33.2 G/DL (ref 31–37)
MCHC RBC AUTO-ENTMCNC: 33.4 G/DL (ref 31–37)
MCV RBC AUTO: 83.6 FL
MCV RBC AUTO: 84.3 FL
MCV RBC AUTO: 84.3 FL
MCV RBC AUTO: 84.4 FL
MONOCYTES # BLD AUTO: 0.58 X10(3) UL (ref 0.1–1)
MONOCYTES NFR BLD AUTO: 7.9 %
NEUTROPHILS # BLD AUTO: 5.58 X10 (3) UL (ref 1.5–7.7)
NEUTROPHILS # BLD AUTO: 5.58 X10(3) UL (ref 1.5–7.7)
NEUTROPHILS NFR BLD AUTO: 76.1 %
OSMOLALITY SERPL CALC.SUM OF ELEC: 292 MOSM/KG (ref 275–295)
P AXIS: 54 DEGREES
P-R INTERVAL: 168 MS
PLATELET # BLD AUTO: 204 10(3)UL (ref 150–450)
PLATELET # BLD AUTO: 212 10(3)UL (ref 150–450)
PLATELET # BLD AUTO: 213 10(3)UL (ref 150–450)
PLATELET # BLD AUTO: 213 10(3)UL (ref 150–450)
POTASSIUM SERPL-SCNC: 3.9 MMOL/L (ref 3.5–5.1)
PSA SERPL DL<=0.01 NG/ML-MCNC: 16.7 SECONDS (ref 12.4–14.6)
PSA SERPL DL<=0.01 NG/ML-MCNC: 17 SECONDS (ref 12.4–14.6)
PSA SERPL DL<=0.01 NG/ML-MCNC: 17.1 SECONDS (ref 12.4–14.6)
PSA SERPL DL<=0.01 NG/ML-MCNC: 17.4 SECONDS (ref 12.4–14.6)
Q-T INTERVAL: 430 MS
QRS DURATION: 96 MS
QTC CALCULATION (BEZET): 486 MS
R AXIS: 51 DEGREES
RBC # BLD AUTO: 4.4 X10(6)UL
RBC # BLD AUTO: 4.4 X10(6)UL
RBC # BLD AUTO: 4.43 X10(6)UL
RBC # BLD AUTO: 4.51 X10(6)UL
SODIUM SERPL-SCNC: 140 MMOL/L (ref 136–145)
T AXIS: 29 DEGREES
VENTRICULAR RATE: 77 BPM
WBC # BLD AUTO: 7.3 X10(3) UL (ref 4–11)
WBC # BLD AUTO: 7.9 X10(3) UL (ref 4–11)
WBC # BLD AUTO: 8.2 X10(3) UL (ref 4–11)
WBC # BLD AUTO: 8.4 X10(3) UL (ref 4–11)

## 2020-12-11 PROCEDURE — 37252 INTRVASC US NONCORONARY 1ST: CPT | Performed by: INTERNAL MEDICINE

## 2020-12-11 PROCEDURE — 067D3DZ DILATION OF LEFT COMMON ILIAC VEIN WITH INTRALUMINAL DEVICE, PERCUTANEOUS APPROACH: ICD-10-PCS | Performed by: INTERNAL MEDICINE

## 2020-12-11 PROCEDURE — 99232 SBSQ HOSP IP/OBS MODERATE 35: CPT | Performed by: HOSPITALIST

## 2020-12-11 PROCEDURE — B54CZZ3 ULTRASONOGRAPHY OF LEFT LOWER EXTREMITY VEINS, INTRAVASCULAR: ICD-10-PCS | Performed by: INTERNAL MEDICINE

## 2020-12-11 PROCEDURE — 37214 CESSJ THERAPY CATH REMOVAL: CPT | Performed by: INTERNAL MEDICINE

## 2020-12-11 PROCEDURE — 37238 OPEN/PERQ PLACE STENT SAME: CPT | Performed by: INTERNAL MEDICINE

## 2020-12-11 RX ORDER — HEPARIN SODIUM 5000 [USP'U]/ML
80 INJECTION INTRAVENOUS; SUBCUTANEOUS ONCE
Status: COMPLETED | OUTPATIENT
Start: 2020-12-11 | End: 2020-12-11

## 2020-12-11 RX ORDER — MIDAZOLAM HYDROCHLORIDE 1 MG/ML
INJECTION INTRAMUSCULAR; INTRAVENOUS
Status: COMPLETED
Start: 2020-12-11 | End: 2020-12-11

## 2020-12-11 RX ORDER — LIDOCAINE HYDROCHLORIDE 10 MG/ML
INJECTION, SOLUTION EPIDURAL; INFILTRATION; INTRACAUDAL; PERINEURAL
Status: COMPLETED
Start: 2020-12-11 | End: 2020-12-11

## 2020-12-11 RX ORDER — CLOPIDOGREL BISULFATE 75 MG/1
TABLET ORAL
Status: COMPLETED
Start: 2020-12-11 | End: 2020-12-11

## 2020-12-11 RX ORDER — HEPARIN SODIUM AND DEXTROSE 10000; 5 [USP'U]/100ML; G/100ML
18 INJECTION INTRAVENOUS ONCE
Status: COMPLETED | OUTPATIENT
Start: 2020-12-11 | End: 2020-12-11

## 2020-12-11 RX ORDER — HEPARIN SODIUM AND DEXTROSE 10000; 5 [USP'U]/100ML; G/100ML
18 INJECTION INTRAVENOUS ONCE
Status: DISCONTINUED | OUTPATIENT
Start: 2020-12-11 | End: 2020-12-11

## 2020-12-11 RX ORDER — HEPARIN SODIUM 5000 [USP'U]/ML
80 INJECTION INTRAVENOUS; SUBCUTANEOUS ONCE
Status: DISCONTINUED | OUTPATIENT
Start: 2020-12-11 | End: 2020-12-11

## 2020-12-11 RX ORDER — HEPARIN SODIUM 5000 [USP'U]/ML
INJECTION, SOLUTION INTRAVENOUS; SUBCUTANEOUS
Status: COMPLETED
Start: 2020-12-11 | End: 2020-12-11

## 2020-12-11 RX ORDER — HEPARIN SODIUM AND DEXTROSE 10000; 5 [USP'U]/100ML; G/100ML
INJECTION INTRAVENOUS CONTINUOUS
Status: DISPENSED | OUTPATIENT
Start: 2020-12-12 | End: 2020-12-12

## 2020-12-11 RX ORDER — CEFAZOLIN SODIUM/WATER 2 G/20 ML
SYRINGE (ML) INTRAVENOUS
Status: COMPLETED
Start: 2020-12-11 | End: 2020-12-11

## 2020-12-11 RX ORDER — HEPARIN SODIUM AND DEXTROSE 10000; 5 [USP'U]/100ML; G/100ML
INJECTION INTRAVENOUS CONTINUOUS
Status: DISCONTINUED | OUTPATIENT
Start: 2020-12-11 | End: 2020-12-11

## 2020-12-11 NOTE — PROGRESS NOTES
[facility]  Progress note     Zane Garvey Patient Status:  Inpatient    1955 MRN [id number]   [facility] Rosemarie Chacon MD   [facility] Day [facility] PCP Carley Ornelas MD     Chief Complaint: leg swelling  Subjective: patient fe No results for input(s): TROP, CK in the last 168 hours. Imaging: Imaging data reviewed in Epic. ASSESSMENT / PLAN:     1. LLE DVT-ekos/tpa ongoing; defer timing anticoag after ekos removed to cards/hemeonc  2.  Hx RCC-will d/w heme/onc if any

## 2020-12-11 NOTE — PROGRESS NOTES
Hematology note    Patient in Cath Lab when attempted to see today during rounds. Appears to have had good result with CDT. IVUS consistent with may Thurner, stent placed. Will be starting heparin later today.   If doing well I would favor transitioning

## 2020-12-11 NOTE — PLAN OF CARE
Assumed care of pt at 0730. He is alert/oriented x 4. Denies any pain. Left leg sheath site is oozing serous/serosanguinous drainage. Plan to go back to the lab today. Tpa infusing at 1 mg/hr/ heparin through side port at 100 units/hr.  Labs are stable an if indicated  - Evaluate effectiveness of antiarrhythmic and heart rate control medications as ordered  - Initiate emergency measures for life threatening arrhythmias  - Monitor electrolytes and administer replacement therapy as ordered  Outcome: Progressi

## 2020-12-11 NOTE — PAYOR COMM NOTE
--------------  CONTINUED STAY REVIEW    Payor: [employer] #:  [id number]  Authorization Number: [id number]       Admit date: 12/10/20  Admit time: 65    Admitting Physician: Sharon Alan MD  Attending Physician:  Sharon Alan MD  Pr 12/10/2020 [facility] (none) Intravenous Liliana Miranda, RN            Plan: 12/11  Yina Mejia MD   Physician   Hospitalist   Progress Notes      Signed   Date of Service:  12/11/2020  8:55 AM               Signed                [facility] AST 18 16  --    ALT 19 20  --    BILT 0.8 0.8  --    TP 7.2 7.3  --          Estimated Creatinine Clearance: 62.7 mL/min (A) (based on SCr of 1.31 mg/dL (H)).           Recent Labs   Lab 12/10/20  2034 12/11/20  0005 12/11/20  0359   PTP 16.2* 17.1* 17.0*

## 2020-12-11 NOTE — PROGRESS NOTES
LFV angio widely patent SFV with narrowing iliac  16 x 90 Venuti deployed  IVUS May Thurner  Excellent result  Resume heparin full dose in 4 hours  Start long term anticoagulation

## 2020-12-11 NOTE — PLAN OF CARE
Assumed care of pt @ 1930. Rec'd pt in bed, resting comfortably. VSS, afebrile. Tele=NSR/SB, normotensive. Few episodes of 2nd degree AVB II.  Pt. Asymptomatic. [place] catheter to left popliteal vein noted with TPA/Heparin infusing per MD orders.   C

## 2020-12-12 LAB
ANION GAP SERPL CALC-SCNC: 5 MMOL/L (ref 0–18)
APTT PPP: 156 SECONDS (ref 25.4–36.1)
APTT PPP: >300 SECONDS (ref 25.4–36.1)
BUN BLD-MCNC: 15 MG/DL (ref 7–18)
BUN/CREAT SERPL: 12 (ref 10–20)
CALCIUM BLD-MCNC: 7.9 MG/DL (ref 8.5–10.1)
CHLORIDE SERPL-SCNC: 109 MMOL/L (ref 98–112)
CO2 SERPL-SCNC: 25 MMOL/L (ref 21–32)
CREAT BLD-MCNC: 1.25 MG/DL
DEPRECATED RDW RBC AUTO: 46.6 FL (ref 35.1–46.3)
ERYTHROCYTE [DISTWIDTH] IN BLOOD BY AUTOMATED COUNT: 15.5 % (ref 11–15)
GLUCOSE BLD-MCNC: 92 MG/DL (ref 70–99)
HCT VFR BLD AUTO: 35.5 %
HGB BLD-MCNC: 11.6 G/DL
MCH RBC QN AUTO: 27.2 PG (ref 26–34)
MCHC RBC AUTO-ENTMCNC: 32.7 G/DL (ref 31–37)
MCV RBC AUTO: 83.1 FL
OSMOLALITY SERPL CALC.SUM OF ELEC: 288 MOSM/KG (ref 275–295)
PLATELET # BLD AUTO: 190 10(3)UL (ref 150–450)
POTASSIUM SERPL-SCNC: 3.6 MMOL/L (ref 3.5–5.1)
RBC # BLD AUTO: 4.27 X10(6)UL
SODIUM SERPL-SCNC: 139 MMOL/L (ref 136–145)
WBC # BLD AUTO: 8.5 X10(3) UL (ref 4–11)

## 2020-12-12 PROCEDURE — 99232 SBSQ HOSP IP/OBS MODERATE 35: CPT | Performed by: INTERNAL MEDICINE

## 2020-12-12 PROCEDURE — 99232 SBSQ HOSP IP/OBS MODERATE 35: CPT | Performed by: SPECIALIST

## 2020-12-12 PROCEDURE — 99232 SBSQ HOSP IP/OBS MODERATE 35: CPT | Performed by: HOSPITALIST

## 2020-12-12 RX ORDER — FUROSEMIDE 10 MG/ML
20 INJECTION INTRAMUSCULAR; INTRAVENOUS
Status: COMPLETED | OUTPATIENT
Start: 2020-12-12 | End: 2020-12-12

## 2020-12-12 NOTE — PROGRESS NOTES
[facility] Cardiology  Progress Note    Maranda Proctor Patient Status:  Inpatient    1955 MRN [id number]   [facility] 6NE-A Attending Milana Bowles MD   Hosp Day # 2 PCP Julius Lopez MD       Assessment and Plan:    1.  DVT -status post c 12/11/20  0818 12/11/20  1207 12/12/20  0231   PTP 17.0* 16.7* 17.4*  --    INR 1.34* 1.31* 1.38*  --    PTT 41.3* 40.1* 38.9* >300.0*       Recent Labs   Lab 12/09/20  1740 12/10/20  0852 12/11/20  0359 12/12/20  0231   GLU 91 103* 104* 92   BUN 13 15 17

## 2020-12-12 NOTE — PROGRESS NOTES
Hem/Onc Report Progress Note    Patient Name: Rodney Yeung   YOB: 1955   Medical Record Number: [id number]       Subjective  Patient with acute left lower extremity DVT s/p CDT. Patient denies any pain. Persistent edema left leg.  No bleeding ONCE PRN    •  ondansetron HCl (ZOFRAN) injection 8 mg, 8 mg, Intravenous, Q6H PRN    •  bisacodyl (DULCOLAX) rectal suppository 10 mg, 10 mg, Rectal, Daily PRN    •  PEG 3350 (MIRALAX) powder packet 17 g, 17 g, Oral, Daily PRN    •  Senna (SENOKOT) tab 8. [COMPLETED] alteplase (ACTIVASE) 2 MG injection, , ,     •  [COMPLETED] Midazolam HCl (VERSED) 2 MG/2ML injection, , ,     •  [COMPLETED] fentaNYL citrate (SUBLIMAZE) 0.05 MG/ML injection, , ,     •  [COMPLETED] alteplase (ACTIVASE) 2 MG injection, , , RBC 4.51 3.80 - 5.80 x10(6)uL    HGB 12.2 (L) 13.0 - 17.5 g/dL    HCT 38.0 (L) 39.0 - 53.0 %    .0 150.0 - 450.0 10(3)uL    MCV 84.3 80.0 - 100.0 fL    MCH 27.1 26.0 - 34.0 pg    MCHC 32.1 31.0 - 37.0 g/dL    RDW 15.4 (H) 11.0 - 15.0 %    RDW-SD 46. heparin. Tonight start apixaban 10 mg bid and d/c IV heparin. Expect d/c home tomorrow. Electronically signed by:    Natasha Calix M.D.   Associate Medical Director of Oncology [facility], [place], [place]

## 2020-12-12 NOTE — PLAN OF CARE
Assumed care of pt @ 1930. Rec'd pt in bed, resting. Pt. Denies  any pain/discomfort @ this time. VSS, afebrile. Tele=NSR, rate 60's, normotensive.  +3 LLE edema noted, greater than RLE. CMS intact. Heparin drip initiated per PE/DVT protocol.

## 2020-12-12 NOTE — PHYSICAL THERAPY NOTE
PHYSICAL THERAPY QUICK EVALUATION - INPATIENT    Room Number: 9629/3324-K  Evaluation Date: 12/12/2020  Presenting Problem: DVT  Physician Order: PT Eval and Treat    Problem List  Principal Problem:    Deep vein thrombosis (DVT) of proximal vein of left ACTIVITY TOLERANCE                         O2 WALK                  AM-PAC '6-Clicks' INPATIENT SHORT FORM - BASIC MOBILITY  How much difficulty does the patient currently have. ..  -   Turning over in bed (including adjusting bedclothes, sheets a been evaluated and presents with no skilled Physical Therapy needs at this time. Patient discharged from Physical Therapy services. Please re-order if a new functional limitation presents during this admission.     GOALS  Patient was able to achieve the f

## 2020-12-12 NOTE — PROGRESS NOTES
[facility]  Progress note     Mason Santiago Patient Status:  Inpatient    1955 MRN [id number]   [facility] Alli Hernández MD   [facility] Day # 2 PCP Chelsey Mccoy MD     Chief Complaint: leg swelling  Subjective: patient fe 12/11/20  1207   PTP 17.0* 16.7* 17.4*   INR 1.34* 1.31* 1.38*       No results for input(s): TROP, CK in the last 168 hours. Imaging: Imaging data reviewed in Epic.       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

## 2020-12-12 NOTE — PLAN OF CARE
Assumed care of patient at 0730. Patient A/Ox4. VSS, NSR on the monitor. Breath sounds clear on RA. Voiding in the urinal. Denies pain. Ambulating in the chen. Heparin gtt per PE/DVT protocol. Patient to start on Eliquis tonight and d/c heparin gtt.  Plan o

## 2020-12-13 VITALS
RESPIRATION RATE: 18 BRPM | HEIGHT: 73 IN | TEMPERATURE: 99 F | HEART RATE: 99 BPM | DIASTOLIC BLOOD PRESSURE: 53 MMHG | OXYGEN SATURATION: 99 % | SYSTOLIC BLOOD PRESSURE: 113 MMHG | BODY MASS INDEX: 34.24 KG/M2 | WEIGHT: 258.38 LBS

## 2020-12-13 LAB
ANION GAP SERPL CALC-SCNC: 2 MMOL/L (ref 0–18)
BASOPHILS # BLD AUTO: 0.04 X10(3) UL (ref 0–0.2)
BASOPHILS NFR BLD AUTO: 0.5 %
BUN BLD-MCNC: 16 MG/DL (ref 7–18)
BUN/CREAT SERPL: 10.3 (ref 10–20)
CALCIUM BLD-MCNC: 8.4 MG/DL (ref 8.5–10.1)
CHLORIDE SERPL-SCNC: 107 MMOL/L (ref 98–112)
CO2 SERPL-SCNC: 29 MMOL/L (ref 21–32)
CREAT BLD-MCNC: 1.55 MG/DL
DEPRECATED RDW RBC AUTO: 46.5 FL (ref 35.1–46.3)
EOSINOPHIL # BLD AUTO: 0.15 X10(3) UL (ref 0–0.7)
EOSINOPHIL NFR BLD AUTO: 2 %
ERYTHROCYTE [DISTWIDTH] IN BLOOD BY AUTOMATED COUNT: 15.4 % (ref 11–15)
GLUCOSE BLD-MCNC: 95 MG/DL (ref 70–99)
HCT VFR BLD AUTO: 38 %
HGB BLD-MCNC: 12.6 G/DL
IMM GRANULOCYTES # BLD AUTO: 0.04 X10(3) UL (ref 0–1)
IMM GRANULOCYTES NFR BLD: 0.5 %
LYMPHOCYTES # BLD AUTO: 1.25 X10(3) UL (ref 1–4)
LYMPHOCYTES NFR BLD AUTO: 17 %
MCH RBC QN AUTO: 27.6 PG (ref 26–34)
MCHC RBC AUTO-ENTMCNC: 33.2 G/DL (ref 31–37)
MCV RBC AUTO: 83.3 FL
MONOCYTES # BLD AUTO: 0.59 X10(3) UL (ref 0.1–1)
MONOCYTES NFR BLD AUTO: 8 %
NEUTROPHILS # BLD AUTO: 5.3 X10 (3) UL (ref 1.5–7.7)
NEUTROPHILS # BLD AUTO: 5.3 X10(3) UL (ref 1.5–7.7)
NEUTROPHILS NFR BLD AUTO: 72 %
OSMOLALITY SERPL CALC.SUM OF ELEC: 287 MOSM/KG (ref 275–295)
PLATELET # BLD AUTO: 206 10(3)UL (ref 150–450)
PLATELET # BLD AUTO: 206 10(3)UL (ref 150–450)
POTASSIUM SERPL-SCNC: 3.7 MMOL/L (ref 3.5–5.1)
RBC # BLD AUTO: 4.56 X10(6)UL
SODIUM SERPL-SCNC: 138 MMOL/L (ref 136–145)
WBC # BLD AUTO: 7.4 X10(3) UL (ref 4–11)

## 2020-12-13 PROCEDURE — 99239 HOSP IP/OBS DSCHRG MGMT >30: CPT | Performed by: HOSPITALIST

## 2020-12-13 PROCEDURE — 99232 SBSQ HOSP IP/OBS MODERATE 35: CPT | Performed by: SPECIALIST

## 2020-12-13 PROCEDURE — 99232 SBSQ HOSP IP/OBS MODERATE 35: CPT | Performed by: INTERNAL MEDICINE

## 2020-12-13 RX ORDER — CLOPIDOGREL BISULFATE 75 MG/1
75 TABLET ORAL DAILY
Status: DISCONTINUED | OUTPATIENT
Start: 2020-12-13 | End: 2020-12-13

## 2020-12-13 RX ORDER — CLOPIDOGREL BISULFATE 75 MG/1
75 TABLET ORAL DAILY
Qty: 30 TABLET | Refills: 2 | Status: SHIPPED | OUTPATIENT
Start: 2020-12-13 | End: 2021-03-16

## 2020-12-13 NOTE — PROGRESS NOTES
Hem/Onc Report Progress Note    Patient Name: Bubba Thomson   YOB: 1955   Medical Record Number: [id number]       Subjective  Patient with acute left lower extremity DVT s/p CDT. Patient denies any pain. Persistent edema left leg.  No bleeding 100 mL, Intravenous, ONCE PRN    •  ondansetron HCl (ZOFRAN) injection 8 mg, 8 mg, Intravenous, Q6H PRN    •  bisacodyl (DULCOLAX) rectal suppository 10 mg, 10 mg, Rectal, Daily PRN    •  PEG 3350 (MIRALAX) powder packet 17 g, 17 g, Oral, Daily PRN    •  S injection, , ,     •  [COMPLETED] alteplase (ACTIVASE) 2 MG injection, , ,     •  [COMPLETED] Midazolam HCl (VERSED) 2 MG/2ML injection, , ,     •  [COMPLETED] fentaNYL citrate (SUBLIMAZE) 0.05 MG/ML injection, , ,     •  [COMPLETED] alteplase (ACTIVASE) 2 Collection Time: 12/13/20  6:11 AM   Result Value Ref Range    Glucose 95 70 - 99 mg/dL    Sodium 138 136 - 145 mmol/L    Potassium 3.7 3.5 - 5.1 mmol/L    Chloride 107 98 - 112 mmol/L    CO2 29.0 21.0 - 32.0 mmol/L    Anion Gap 2 0 - 18 mmol/L    BUN 16 7

## 2020-12-13 NOTE — DISCHARGE SUMMARY
[facility]  DISCHARGE SUMMARY     Michelle Fernandes Patient Status:  Inpatient    1955 MRN [id number]   [facility] 7NE-A Attending Sari Bella MD   [facility] Day # 3 PCP Molly Cheadle, MD     Date of Admission: 12/10/2020  Date of Dis on heparin drip after TPA was finished. And then converted over to therapeutic dosing of Eliquis under the guidance of hematology. Will need close follow-up with cardiology and hematology as outpatient.   Swelling in left leg drastically improved compared MOUTH EVERY DAY   Quantity: 90 tablet  Refills: 1     vitamin C 100 MG Tabs      Take 100 mg by mouth daily. Refills: 0     VITAMIN D OR      Take 1 tablet by mouth daily.    Refills: 0        STOP taking these medications    aspirin 81 MG Tbec INSTRUCTIONS: See electronic chart    Wesley Brown MD    Time spent:  > 30 minutes

## 2020-12-13 NOTE — PLAN OF CARE
Received pt from CNICU to rm 7610. Pt a/o x 4. Denies any pain or other discomfort upon arrival to the unit. Lt popliteal drsg c/d/i s/p recent thrombectomy. Pedal pulses palpable bilt. (+) CMS. BLE edema LLE > RLE. Received Lasix today. Voids freely.   E Assess and document skin integrity  - Monitor for areas of redness and/or skin breakdown  - Initiate interventions, skin care algorithm/standards of care as needed  Outcome: Progressing     Problem: HEMATOLOGIC - ADULT  Goal: Maintains hematologic stabilit

## 2020-12-13 NOTE — PROGRESS NOTES
[facility]  Cardiology Progress Note    [facility] Patient Status:  Inpatient    1955 MRN [id number]   [facility] 7NE-A Attending Álvaro Cooper MD   Hosp Day # 3 PCP Jelena Zhang MD     Subjective:  Patient ready to go home, Assessment:  • L DVT s/p catheter directed thrombolytics with recanalization of the femoral and iliac on Eliquis, will start Plavix, discontinue ASA  • CAD  • HTN  • HLD     Plan:   • Continue eliquis per hematology  • Discontinue ASA, start plavix

## 2020-12-13 NOTE — PLAN OF CARE
NURSING DISCHARGE NOTE    Assumed pt care at 0730  Pt denies pain   Left leg swelling, but improved  Left popliteal dressing removed    Ok to d/c per all consults    IV removed  Tele removed    Medications, follow ups and education reviewed with patien infection  Description: INTERVENTIONS:  - Assess and document risk factors for pressure ulcer development  - Assess and document skin integrity  - Assess and document dressing/incision, wound bed, drain sites and surrounding tissue  - Implement wound care

## 2020-12-13 NOTE — PLAN OF CARE
Assumed care of pt  @6330. Rec'd pt in chair, resting comfortably. Pt. Denies any pain/discomfort @ this time. VSS, afebrile, in no acute distress. Left popliteal cath site c/d/I. Pt. Voiding freely. Heparin drip d/c'd, Eliquis given per MAR.   Pt. St

## 2020-12-15 ENCOUNTER — OFFICE VISIT (OUTPATIENT)
Dept: INTERNAL MEDICINE CLINIC | Facility: CLINIC | Age: 65
End: 2020-12-15
Payer: COMMERCIAL

## 2020-12-15 ENCOUNTER — PATIENT OUTREACH (OUTPATIENT)
Dept: CASE MANAGEMENT | Age: 65
End: 2020-12-15

## 2020-12-15 VITALS
DIASTOLIC BLOOD PRESSURE: 80 MMHG | HEIGHT: 73 IN | RESPIRATION RATE: 16 BRPM | TEMPERATURE: 98 F | WEIGHT: 252 LBS | HEART RATE: 65 BPM | OXYGEN SATURATION: 99 % | BODY MASS INDEX: 33.4 KG/M2 | SYSTOLIC BLOOD PRESSURE: 134 MMHG

## 2020-12-15 DIAGNOSIS — I82.4Y2 ACUTE DEEP VEIN THROMBOSIS (DVT) OF PROXIMAL VEIN OF LEFT LOWER EXTREMITY (HCC): ICD-10-CM

## 2020-12-15 DIAGNOSIS — Z09 HOSPITAL DISCHARGE FOLLOW-UP: Primary | ICD-10-CM

## 2020-12-15 DIAGNOSIS — Z02.9 ENCOUNTERS FOR UNSPECIFIED ADMINISTRATIVE PURPOSE: ICD-10-CM

## 2020-12-15 DIAGNOSIS — Z12.11 COLON CANCER SCREENING: ICD-10-CM

## 2020-12-15 DIAGNOSIS — I82.412 ACUTE DEEP VEIN THROMBOSIS (DVT) OF FEMORAL VEIN OF LEFT LOWER EXTREMITY (HCC): ICD-10-CM

## 2020-12-15 PROCEDURE — 3079F DIAST BP 80-89 MM HG: CPT | Performed by: INTERNAL MEDICINE

## 2020-12-15 PROCEDURE — 1111F DSCHRG MED/CURRENT MED MERGE: CPT

## 2020-12-15 PROCEDURE — 99496 TRANSJ CARE MGMT HIGH F2F 7D: CPT | Performed by: INTERNAL MEDICINE

## 2020-12-15 PROCEDURE — 3008F BODY MASS INDEX DOCD: CPT | Performed by: INTERNAL MEDICINE

## 2020-12-15 PROCEDURE — 1111F DSCHRG MED/CURRENT MED MERGE: CPT | Performed by: INTERNAL MEDICINE

## 2020-12-15 PROCEDURE — 3075F SYST BP GE 130 - 139MM HG: CPT | Performed by: INTERNAL MEDICINE

## 2020-12-15 NOTE — PROGRESS NOTES
HPI:    Jane Sarmiento is a 76year old male here today for hospital follow up.    He was discharged from Inpatient hospital, [facility] to Home   Admission Date: 12/10/20   Discharge Date: 12/13/20  Hospital Discharge Diagnoses (since 11/15/2020)   No found to have suspected May Thurner syndrome underwent TPA catheter directed lysis and placement of venous stent in the common and external iliac veins. Was briefly on heparin drip after TPA was finished.   And then converted over to therapeutic dosing o that he has never smoked. He has never used smokeless tobacco. He reports that he does not drink alcohol or use drugs.      ROS:   Review of Systems  A comprehensive 10 point review of systems was completed.     Pertinent positives and negatives noted in th requested or ordered in this encounter       Imaging & Consults:  None         Transitional Care Management Certification:  I certify that the following are true:  Communication with the patient was made within 2 business days of discharge on date above

## 2020-12-15 NOTE — PROGRESS NOTES
Attempted to reach the patient to complete [facility] FU call. Left a message for the pt to call NCM back at, [phone]. The number for the TCC was also given to the patient to schedule at, [phone].

## 2020-12-15 NOTE — PROGRESS NOTES
Initial Post Discharge Follow Up   Discharge Date: 12/13/20  Contact Date: 12/15/2020    Consent Verification:  Assessment Completed With: Patient  HIPAA Verified? Yes    Discharge Dx:      May thurner syndrome, LLE DVT-s/p EKOS/tpa   Hx RCC  CAD with h yes  • (NCM) Was patient given a different diet per AVS? no        Medications: NCM did confirm the patient has discontinued aspirin as directed.    Current Outpatient Medications   Medication Sig Dispense Refill   • apixaban 5 MG Oral Tab 2 tablets PO BID 2020  6:00 PM [address] [place] [address] Follow Up with Jeremiah Gan MD [place] [address], Kaila Patton ([address] [facility])        Dec 30, 2020  3:30 PM CST Follow-Up Visit with Xu Castañeda MD [facility] in [place] ED/call 911. Appointments were reviewed and stressed the importance of a close follow up with providers. The patient verbalized understanding and will contact the office with any further questions or concerns.        CCM referral placed:  No    BOOK BY GILA

## 2020-12-15 NOTE — PATIENT INSTRUCTIONS
Understanding Deep Vein Thrombosis  Deep vein thrombosis (DVT) is a condition in which a blood clot or thrombus forms in a deep vein.  A blood clot is most common in the leg, but can develop in a large vein deep inside the leg, arm, or other part of the b Common symptoms  A blood clot does not always cause obvious symptoms. If you do have symptoms, they usually happen suddenly.  They may include:  · Pain, especially deep in the muscle  · Swelling  · Aching or tenderness  · Red or warm skin  · Fever  Call you Yolis last reviewed this educational content on 12/1/2019  © 4463-0581 [employer] [address]. [address] [facility], [facility] [place]. All rights reserved. This information is not intended as a substitute for professional medical care.  Always foll

## 2020-12-18 NOTE — PAYOR COMM NOTE
--------------  CONTINUED STAY REVIEW    Payor: Celso Zuniga #:  [id number]  Authorization Number: [id number]       Admit date: 12/10/20  [facility] 12/12/20  Chief Complaint: leg swelling    P heparin drip now; defer to heme/onc timing to transition to doac  2. Hx RCC-OP Imaging possibly to reassess any recurrence. October ct showed no obvious recurrence luckily  3.  CAD with hx stenting-resume bb/statin/asa  Heparin drip; defer to heme/onc mariam

## 2020-12-28 DIAGNOSIS — I82.4Y2 DEEP VEIN THROMBOSIS (DVT) OF PROXIMAL VEIN OF LEFT LOWER EXTREMITY, UNSPECIFIED CHRONICITY (HCC): ICD-10-CM

## 2020-12-28 NOTE — TELEPHONE ENCOUNTER
Patient needs a prescription of Eliquois asap. He will be out by Wednesday.     [facility]    [facility]  [address], [phone], [phone]    Please leave message when completed

## 2020-12-29 ENCOUNTER — TELEPHONE (OUTPATIENT)
Dept: INTERNAL MEDICINE CLINIC | Facility: CLINIC | Age: 65
End: 2020-12-29

## 2020-12-29 NOTE — TELEPHONE ENCOUNTER
Christine called pt to let him know his Eliquis is ready for pickup. Patient wanted to thank our office for helping him with this.

## 2020-12-29 NOTE — TELEPHONE ENCOUNTER
Patient received a call from [facility] stating they cannot refill Eliquis until 01/07/2021 due to insurance. He will run out of Eliquis this Thursday, so do we have samples? Or, does Dr Catarino Ponce want him to go on Warfarin?

## 2020-12-29 NOTE — TELEPHONE ENCOUNTER
Per Dr. Saima Noble. Mariela Zhou for early refill due to patient having a DVT    Spoke to Pharmacist from [facility] and provided verbal for early refill.  Pharmacist said there is an insurance issue and will contact insurance company to figure out what is going on and wh

## 2020-12-30 ENCOUNTER — OFFICE VISIT (OUTPATIENT)
Dept: HEMATOLOGY/ONCOLOGY | Facility: HOSPITAL | Age: 65
End: 2020-12-30
Attending: INTERNAL MEDICINE
Payer: COMMERCIAL

## 2020-12-30 VITALS
BODY MASS INDEX: 33.4 KG/M2 | RESPIRATION RATE: 16 BRPM | HEART RATE: 67 BPM | WEIGHT: 252 LBS | OXYGEN SATURATION: 100 % | TEMPERATURE: 97 F | DIASTOLIC BLOOD PRESSURE: 76 MMHG | HEIGHT: 72.99 IN | SYSTOLIC BLOOD PRESSURE: 124 MMHG

## 2020-12-30 DIAGNOSIS — I87.1 MAY-THURNER SYNDROME: ICD-10-CM

## 2020-12-30 DIAGNOSIS — I82.4Y2 ACUTE DEEP VEIN THROMBOSIS (DVT) OF PROXIMAL VEIN OF LEFT LOWER EXTREMITY (HCC): Primary | ICD-10-CM

## 2020-12-30 DIAGNOSIS — N18.31 STAGE 3A CHRONIC KIDNEY DISEASE (HCC): ICD-10-CM

## 2020-12-30 DIAGNOSIS — M79.89 LEFT LEG SWELLING: ICD-10-CM

## 2020-12-30 DIAGNOSIS — C64.2 RENAL CELL CARCINOMA, LEFT (HCC): ICD-10-CM

## 2020-12-30 DIAGNOSIS — D64.9 NORMOCYTIC ANEMIA: ICD-10-CM

## 2020-12-30 PROCEDURE — 99215 OFFICE O/P EST HI 40 MIN: CPT | Performed by: INTERNAL MEDICINE

## 2020-12-30 NOTE — PROGRESS NOTES
Hematology Clinic Follow Up Visit    Patient Name: [facility]  Medical Record Number: [id number]    YOB: 1955    PCP: Dr. Hayden Arnett  Other providers: Dr. Khadra Anderson    Reason for Consultation:  [facility] was seen today for the [facility] foot surgery   • HIP SURGERY  07/2015   • OTHER SURGICAL HISTORY  2011    cath laser 1 distal L anterior Desc artery   • REPAIR OF NASAL SEPTUM  1998   • TOTAL HIP REPLACEMENT Left     2014       Home Medications:    •  apixaban 5 MG Oral Tab, Take 1 ta Encounters:  12/30/20 : 114.3 kg (252 lb)  12/15/20 : 114.3 kg (252 lb)  12/11/20 : 117.2 kg (258 lb 6.1 oz)  12/09/20 : 116.1 kg (256 lb)  11/24/20 : 116.1 kg (256 lb)  09/30/20 : 115.2 kg (254 lb)    Physical Examination:  General: Patient is alert and o venous stenting for May Thurner on 12/11/20.   Also has history of RLE DVT provoked by surgery in 2014.  -s/p CDT and stenting of May Thbritni stenosis  -Continue Eliquis 5mg BID, likely will plan to stop after 3 month duration given clots were provoked  -co

## 2020-12-30 NOTE — PROGRESS NOTES
Education Record    Learner:  Patient    Disease / Diagnosis:DVT    Barriers / Limitations:  None   Comments:    Method:  Discussion   Comments:    General Topics:  Plan of care reviewed   Comments:    Outcome:  Shows understanding   Comments:post hospital

## 2020-12-31 PROBLEM — I87.1 MAY-THURNER SYNDROME: Status: ACTIVE | Noted: 2020-12-31

## 2020-12-31 PROBLEM — D64.9 NORMOCYTIC ANEMIA: Status: ACTIVE | Noted: 2020-12-31

## 2021-01-04 ENCOUNTER — TELEPHONE (OUTPATIENT)
Dept: INTERNAL MEDICINE CLINIC | Facility: CLINIC | Age: 66
End: 2021-01-04

## 2021-01-04 NOTE — TELEPHONE ENCOUNTER
Patient was advised by Dr. Sheila Saeed to discontinue the plavix on Saturday 1/9/2021. I informed patient that I would update Dr. Derek Gupta.     Patient also wanted to inform Dr. Derek Gupta that Dr. Sheila Saeed has placed another CMP to be drawn in March and will continue to mo

## 2021-01-04 NOTE — TELEPHONE ENCOUNTER
Patient called and stated he seen his hematologist, and was told his creatinine are elevated. He stated he was told 1.47 is elevated, but not too much, and would like to know what Dr Rossi Santana wants him to do. He has Re-scheduled his leg US to 03/2021.

## 2021-01-15 ENCOUNTER — TELEPHONE (OUTPATIENT)
Dept: INTERNAL MEDICINE CLINIC | Facility: CLINIC | Age: 66
End: 2021-01-15

## 2021-01-15 NOTE — TELEPHONE ENCOUNTER
Patient just wanted to tell us that the following dates he will be getting the Vaccine at his [place] office:    1st vaccine January 23  2nd vaccine is February 13    Just for our records

## 2021-01-15 NOTE — TELEPHONE ENCOUNTER
This is for COVID. We will need to reach out to pt to confirm which vaccine he will be receiving after he receives his first one.

## 2021-01-19 ENCOUNTER — OFFICE VISIT (OUTPATIENT)
Dept: CARDIOLOGY | Age: 66
End: 2021-01-19

## 2021-01-19 VITALS
HEIGHT: 73 IN | WEIGHT: 254 LBS | HEART RATE: 64 BPM | DIASTOLIC BLOOD PRESSURE: 70 MMHG | BODY MASS INDEX: 33.66 KG/M2 | SYSTOLIC BLOOD PRESSURE: 120 MMHG

## 2021-01-19 DIAGNOSIS — I10 ESSENTIAL HYPERTENSION: ICD-10-CM

## 2021-01-19 DIAGNOSIS — I82.5Y2 CHRONIC DEEP VEIN THROMBOSIS (DVT) OF PROXIMAL VEIN OF LEFT LOWER EXTREMITY (CMD): ICD-10-CM

## 2021-01-19 DIAGNOSIS — N18.32 STAGE 3B CHRONIC KIDNEY DISEASE (CMD): ICD-10-CM

## 2021-01-19 DIAGNOSIS — I25.10 CORONARY ARTERY DISEASE INVOLVING NATIVE CORONARY ARTERY OF NATIVE HEART WITHOUT ANGINA PECTORIS: Primary | ICD-10-CM

## 2021-01-19 DIAGNOSIS — E78.2 MIXED HYPERLIPIDEMIA: ICD-10-CM

## 2021-01-19 PROBLEM — I87.1 MAY-THURNER SYNDROME: Status: ACTIVE | Noted: 2020-12-31

## 2021-01-19 PROBLEM — N18.30 CKD (CHRONIC KIDNEY DISEASE) STAGE 3, GFR 30-59 ML/MIN (CMD): Status: ACTIVE | Noted: 2019-05-14

## 2021-01-19 PROBLEM — I82.4Y2 DEEP VEIN THROMBOSIS (DVT) OF PROXIMAL VEIN OF LEFT LOWER EXTREMITY (CMD): Status: ACTIVE | Noted: 2020-12-10

## 2021-01-19 PROCEDURE — 99214 OFFICE O/P EST MOD 30 MIN: CPT | Performed by: INTERNAL MEDICINE

## 2021-01-19 PROCEDURE — 3078F DIAST BP <80 MM HG: CPT | Performed by: INTERNAL MEDICINE

## 2021-01-19 PROCEDURE — 3074F SYST BP LT 130 MM HG: CPT | Performed by: INTERNAL MEDICINE

## 2021-01-19 RX ORDER — ROSUVASTATIN CALCIUM 10 MG/1
10 TABLET, COATED ORAL DAILY
COMMUNITY
Start: 2020-12-07

## 2021-01-19 RX ORDER — CLOPIDOGREL BISULFATE 75 MG/1
75 TABLET ORAL DAILY
COMMUNITY
Start: 2020-12-13 | End: 2021-01-19

## 2021-01-19 RX ORDER — METOPROLOL TARTRATE 50 MG/1
50 TABLET, FILM COATED ORAL DAILY
COMMUNITY
Start: 2020-12-07

## 2021-01-19 RX ORDER — APIXABAN 5 MG/1
TABLET, FILM COATED ORAL
COMMUNITY
Start: 2020-12-29

## 2021-01-19 RX ORDER — RIBOFLAVIN (VITAMIN B2) 100 MG
100 TABLET ORAL
COMMUNITY

## 2021-01-19 RX ORDER — LOSARTAN POTASSIUM AND HYDROCHLOROTHIAZIDE 25; 100 MG/1; MG/1
1 TABLET ORAL DAILY
COMMUNITY
Start: 2020-12-07

## 2021-01-19 ASSESSMENT — PATIENT HEALTH QUESTIONNAIRE - PHQ9
1. LITTLE INTEREST OR PLEASURE IN DOING THINGS: NOT AT ALL
2. FEELING DOWN, DEPRESSED OR HOPELESS: NOT AT ALL
SUM OF ALL RESPONSES TO PHQ9 QUESTIONS 1 AND 2: 0
SUM OF ALL RESPONSES TO PHQ9 QUESTIONS 1 AND 2: 0
CLINICAL INTERPRETATION OF PHQ9 SCORE: NO FURTHER SCREENING NEEDED
CLINICAL INTERPRETATION OF PHQ2 SCORE: NO FURTHER SCREENING NEEDED

## 2021-01-19 ASSESSMENT — ENCOUNTER SYMPTOMS
CHILLS: 0
WEIGHT LOSS: 0
BRUISES/BLEEDS EASILY: 0
WEIGHT GAIN: 0
HEMATOCHEZIA: 0
COUGH: 0
ALLERGIC/IMMUNOLOGIC COMMENTS: NO NEW FOOD ALLERGIES
SUSPICIOUS LESIONS: 0
HEMOPTYSIS: 0
FEVER: 0

## 2021-01-21 NOTE — PROCEDURES
[facility]    PATIENT'S NAME: Raymundo Lundborg   ATTENDING PHYSICIAN: Nikky Powers M.D. OPERATING PHYSICIAN: Vamsi De Luna M.D.    PATIENT ACCOUNT#:   [de-identified]    LOCATION:  [address] [place] [address]  MEDICAL RECORD #:   [id number]       DATE OF BIRTH:  [birth year]

## 2021-01-21 NOTE — PROCEDURES
[facility]    PATIENT'S NAME: Gabriela Wilkins   ATTENDING PHYSICIAN: Jaun Martinez M.D. OPERATING PHYSICIAN: Gracie Kang M.D.    PATIENT ACCOUNT#:   [de-identified]    LOCATION:  [address] [place] [address]  MEDICAL RECORD #:   [id number]       DATE OF BIRTH:  [birth year]

## 2021-01-27 ENCOUNTER — TELEPHONE (OUTPATIENT)
Dept: INTERNAL MEDICINE CLINIC | Facility: CLINIC | Age: 66
End: 2021-01-27

## 2021-01-27 NOTE — TELEPHONE ENCOUNTER
Patient received his first COVID test on January 23 and his second is scheduled for February 13th    [facility] vaccination  At [place]    For our records.

## 2021-02-19 ENCOUNTER — HOSPITAL ENCOUNTER (OUTPATIENT)
Dept: ULTRASOUND IMAGING | Facility: HOSPITAL | Age: 66
Discharge: HOME | End: 2021-02-19
Attending: INTERNAL MEDICINE
Payer: COMMERCIAL

## 2021-02-19 DIAGNOSIS — M79.89 LEFT LEG SWELLING: ICD-10-CM

## 2021-02-19 DIAGNOSIS — I82.4Y2 ACUTE DEEP VEIN THROMBOSIS (DVT) OF PROXIMAL VEIN OF LEFT LOWER EXTREMITY (HCC): ICD-10-CM

## 2021-02-19 PROCEDURE — 93971 EXTREMITY STUDY: CPT | Performed by: INTERNAL MEDICINE

## 2021-03-10 ENCOUNTER — OFFICE VISIT (OUTPATIENT)
Dept: HEMATOLOGY/ONCOLOGY | Facility: HOSPITAL | Age: 66
End: 2021-03-10
Attending: INTERNAL MEDICINE
Payer: COMMERCIAL

## 2021-03-10 VITALS
BODY MASS INDEX: 33.13 KG/M2 | SYSTOLIC BLOOD PRESSURE: 138 MMHG | TEMPERATURE: 97 F | HEART RATE: 67 BPM | RESPIRATION RATE: 16 BRPM | WEIGHT: 250 LBS | DIASTOLIC BLOOD PRESSURE: 80 MMHG | OXYGEN SATURATION: 94 % | HEIGHT: 72.99 IN

## 2021-03-10 DIAGNOSIS — Z23 NEED FOR VACCINATION: ICD-10-CM

## 2021-03-10 DIAGNOSIS — C64.2 RENAL CELL CARCINOMA, LEFT (HCC): ICD-10-CM

## 2021-03-10 DIAGNOSIS — I82.4Y2 ACUTE DEEP VEIN THROMBOSIS (DVT) OF PROXIMAL VEIN OF LEFT LOWER EXTREMITY (HCC): Primary | ICD-10-CM

## 2021-03-10 DIAGNOSIS — I87.1 MAY-THURNER SYNDROME: ICD-10-CM

## 2021-03-10 LAB
ALBUMIN SERPL-MCNC: 3.5 G/DL (ref 3.4–5)
ALBUMIN/GLOB SERPL: 0.9 {RATIO} (ref 1–2)
ALP LIVER SERPL-CCNC: 66 U/L
ALT SERPL-CCNC: 28 U/L
ANION GAP SERPL CALC-SCNC: 5 MMOL/L (ref 0–18)
AST SERPL-CCNC: 18 U/L (ref 15–37)
BASOPHILS # BLD AUTO: 0.04 X10(3) UL (ref 0–0.2)
BASOPHILS NFR BLD AUTO: 0.5 %
BILIRUB SERPL-MCNC: 0.4 MG/DL (ref 0.1–2)
BUN BLD-MCNC: 16 MG/DL (ref 7–18)
BUN/CREAT SERPL: 12.8 (ref 10–20)
CALCIUM BLD-MCNC: 9 MG/DL (ref 8.5–10.1)
CHLORIDE SERPL-SCNC: 106 MMOL/L (ref 98–112)
CO2 SERPL-SCNC: 27 MMOL/L (ref 21–32)
CREAT BLD-MCNC: 1.25 MG/DL
D-DIMER: 0.33 UG/ML FEU (ref ?–0.67)
DEPRECATED RDW RBC AUTO: 43.6 FL (ref 35.1–46.3)
EOSINOPHIL # BLD AUTO: 0.06 X10(3) UL (ref 0–0.7)
EOSINOPHIL NFR BLD AUTO: 0.7 %
ERYTHROCYTE [DISTWIDTH] IN BLOOD BY AUTOMATED COUNT: 14.5 % (ref 11–15)
GLOBULIN PLAS-MCNC: 4.1 G/DL (ref 2.8–4.4)
GLUCOSE BLD-MCNC: 106 MG/DL (ref 70–99)
HCT VFR BLD AUTO: 46 %
HGB BLD-MCNC: 15.3 G/DL
IMM GRANULOCYTES # BLD AUTO: 0.03 X10(3) UL (ref 0–1)
IMM GRANULOCYTES NFR BLD: 0.3 %
LYMPHOCYTES # BLD AUTO: 1.79 X10(3) UL (ref 1–4)
LYMPHOCYTES NFR BLD AUTO: 20.5 %
M PROTEIN MFR SERPL ELPH: 7.6 G/DL (ref 6.4–8.2)
MCH RBC QN AUTO: 28.1 PG (ref 26–34)
MCHC RBC AUTO-ENTMCNC: 33.3 G/DL (ref 31–37)
MCV RBC AUTO: 84.4 FL
MONOCYTES # BLD AUTO: 0.66 X10(3) UL (ref 0.1–1)
MONOCYTES NFR BLD AUTO: 7.5 %
NEUTROPHILS # BLD AUTO: 6.17 X10 (3) UL (ref 1.5–7.7)
NEUTROPHILS # BLD AUTO: 6.17 X10(3) UL (ref 1.5–7.7)
NEUTROPHILS NFR BLD AUTO: 70.5 %
OSMOLALITY SERPL CALC.SUM OF ELEC: 288 MOSM/KG (ref 275–295)
PLATELET # BLD AUTO: 234 10(3)UL (ref 150–450)
POTASSIUM SERPL-SCNC: 4 MMOL/L (ref 3.5–5.1)
RBC # BLD AUTO: 5.45 X10(6)UL
SODIUM SERPL-SCNC: 138 MMOL/L (ref 136–145)
WBC # BLD AUTO: 8.8 X10(3) UL (ref 4–11)

## 2021-03-10 PROCEDURE — 99214 OFFICE O/P EST MOD 30 MIN: CPT | Performed by: INTERNAL MEDICINE

## 2021-03-10 NOTE — PROGRESS NOTES
Education Record     Learner:  Patient     Disease / Diagnosis:DVT     Barriers / Limitations:  None                Comments:     Method:  Discussion                Comments:     General Topics:  Plan of care reviewed                Comments:     Outcome:

## 2021-03-10 NOTE — PROGRESS NOTES
Hematology Clinic Follow Up Visit    Patient Name: Jane Sarmiento  Medical Record Number: [id number]   YOB: 1955    PCP: Dr. Cora Martin  Other providers: Dr. Stephane Rich    Reason for Consultation:  Jane Sarmiento was seen today for the katie Lumbago    • May-Thurner syndrome 12/31/2020   • Renal disorder     CKD   • Sinusitis    • Visual impairment     glasses       Past Surgical History:   Procedure Laterality Date   • CATH BARE METAL STENT (BMS)      x2   • FOOT/TOES SURGERY PROC UNLISTED (03/10 1418)  Temp: 96.6 °F (35.9 °C) (03/10 1418)  Do Not Use - Resp Rate: --  SpO2: 94 % (03/10 1418)    Wt Readings from Last 6 Encounters:  03/10/21 : 113.4 kg (250 lb)  12/30/20 : 114.3 kg (252 lb)  12/15/20 : 114.3 kg (252 lb)  12/11/20 : 117.2 kg (2 03/10/2021     Imaging:    As reviewed above    Impression & Plan:     *acute LLE DVT, iliac to popliteal vein, related to May Thurner syndrome   -Underlying May Trine Favre is likely the provoking cause, underwent venous stenting for May Thurner on 12/11/20.

## 2021-03-16 ENCOUNTER — OFFICE VISIT (OUTPATIENT)
Dept: INTERNAL MEDICINE CLINIC | Facility: CLINIC | Age: 66
End: 2021-03-16
Payer: COMMERCIAL

## 2021-03-16 VITALS
WEIGHT: 250 LBS | TEMPERATURE: 97 F | RESPIRATION RATE: 16 BRPM | HEIGHT: 72 IN | BODY MASS INDEX: 33.86 KG/M2 | DIASTOLIC BLOOD PRESSURE: 74 MMHG | HEART RATE: 67 BPM | OXYGEN SATURATION: 99 % | SYSTOLIC BLOOD PRESSURE: 126 MMHG

## 2021-03-16 DIAGNOSIS — I25.10 CORONARY ARTERY DISEASE INVOLVING NATIVE CORONARY ARTERY OF NATIVE HEART WITHOUT ANGINA PECTORIS: ICD-10-CM

## 2021-03-16 DIAGNOSIS — E78.2 MIXED HYPERLIPIDEMIA: ICD-10-CM

## 2021-03-16 DIAGNOSIS — I10 ESSENTIAL HYPERTENSION: Primary | ICD-10-CM

## 2021-03-16 DIAGNOSIS — Z12.11 COLON CANCER SCREENING: ICD-10-CM

## 2021-03-16 DIAGNOSIS — M79.671 RIGHT FOOT PAIN: ICD-10-CM

## 2021-03-16 PROBLEM — Z85.528 HISTORY OF RENAL CARCINOMA: Status: ACTIVE | Noted: 2020-11-24

## 2021-03-16 PROBLEM — I82.4Y2 DEEP VEIN THROMBOSIS (DVT) OF PROXIMAL VEIN OF LEFT LOWER EXTREMITY (HCC): Status: RESOLVED | Noted: 2020-12-10 | Resolved: 2021-03-16

## 2021-03-16 PROBLEM — I87.1 MAY-THURNER SYNDROME: Status: RESOLVED | Noted: 2020-12-31 | Resolved: 2021-03-16

## 2021-03-16 PROBLEM — Z86.718 HISTORY OF DVT (DEEP VEIN THROMBOSIS): Status: ACTIVE | Noted: 2021-03-16

## 2021-03-16 PROBLEM — R73.9 HYPERGLYCEMIA: Status: RESOLVED | Noted: 2020-12-10 | Resolved: 2021-03-16

## 2021-03-16 PROBLEM — N18.30 CKD (CHRONIC KIDNEY DISEASE) STAGE 3, GFR 30-59 ML/MIN (HCC): Status: RESOLVED | Noted: 2019-05-14 | Resolved: 2021-03-16

## 2021-03-16 PROBLEM — D64.9 NORMOCYTIC ANEMIA: Status: RESOLVED | Noted: 2020-12-31 | Resolved: 2021-03-16

## 2021-03-16 PROCEDURE — 3074F SYST BP LT 130 MM HG: CPT | Performed by: INTERNAL MEDICINE

## 2021-03-16 PROCEDURE — 3078F DIAST BP <80 MM HG: CPT | Performed by: INTERNAL MEDICINE

## 2021-03-16 PROCEDURE — 3008F BODY MASS INDEX DOCD: CPT | Performed by: INTERNAL MEDICINE

## 2021-03-16 PROCEDURE — 99214 OFFICE O/P EST MOD 30 MIN: CPT | Performed by: INTERNAL MEDICINE

## 2021-03-16 RX ORDER — ASPIRIN 81 MG/1
81 TABLET, CHEWABLE ORAL DAILY
COMMUNITY

## 2021-03-16 NOTE — PROGRESS NOTES
HPI/Subjective:   Patient ID: Janelle Toth is a 78year old male. HPI  Janelle Toth is a 78year old male. HPI:   Patient presents for recheck of his hypertension, HLD and CAD.  Pt has been taking medications as instructed, no medication side eff TWICE DAILY 90 tablet 1   • LOSARTAN POTASSIUM-HCTZ 100-25 MG Oral Tab TAKE 1 TABLET BY MOUTH DAILY 90 tablet 1   • Multiple Vitamin (MULTI-VITAMIN OR) Take 1 tablet by mouth daily.           Past Medical History:   Diagnosis Date   • Acute MI [facility])    • Al effects noted. PLAN: will continue present medications, cont control of cad risk factors. Refer to podiatry for eval of right foot pain  The patient indicates understanding of these issues and agrees to the plan. The patient is asked to return in 6 m.

## 2021-03-30 ENCOUNTER — TELEPHONE (OUTPATIENT)
Dept: INTERNAL MEDICINE CLINIC | Facility: CLINIC | Age: 66
End: 2021-03-30

## 2021-03-30 NOTE — TELEPHONE ENCOUNTER
Patient called and stated he pulled a muscle left leg yesterday. He took Tylenol, and now is wondering what pain medication he should take to heal. Please send RX to [facility] on file.

## 2021-03-30 NOTE — TELEPHONE ENCOUNTER
Reports: \"Possibly Strained right knee muscle. I am fine. Just a little pain and walking with a limp. \"    \"Not looking for a prescription pain medication or do not think I need any imaging of any sort. \"    \"Safe pain medication due to medication and

## 2021-05-28 ENCOUNTER — TELEPHONE (OUTPATIENT)
Dept: INTERNAL MEDICINE CLINIC | Facility: CLINIC | Age: 66
End: 2021-05-28

## 2021-05-28 NOTE — TELEPHONE ENCOUNTER
Patient called in stating he tripped and hit his head at his home about 15 minutes ago and is feeling a lot of pain on the side of his head. Patient seeking medical advice.

## 2021-05-28 NOTE — TELEPHONE ENCOUNTER
Pt reports having fell in home. Reports having hit head on side of bench, denies having loss of consciousness, is not currently on any blood thinning medications, denies any sever pain, feeling a nausea or vomiting.      Pt reports feeling shook up and star

## 2021-06-01 DIAGNOSIS — I10 ESSENTIAL HYPERTENSION: ICD-10-CM

## 2021-06-01 DIAGNOSIS — E78.2 MIXED HYPERLIPIDEMIA: ICD-10-CM

## 2021-06-01 RX ORDER — ROSUVASTATIN CALCIUM 10 MG/1
10 TABLET, COATED ORAL DAILY
Qty: 90 TABLET | Refills: 1 | Status: SHIPPED | OUTPATIENT
Start: 2021-06-01 | End: 2021-10-10

## 2021-06-01 RX ORDER — METOPROLOL TARTRATE 50 MG/1
25 TABLET, FILM COATED ORAL 2 TIMES DAILY
Qty: 90 TABLET | Refills: 1 | Status: SHIPPED | OUTPATIENT
Start: 2021-06-01 | End: 2022-01-30

## 2021-06-01 NOTE — TELEPHONE ENCOUNTER
Fax request from [facility] in [place], [place] for a refill on Rosuvastatin 10 mg tabs qty 90 and Metoprolol Tartrate 50 mg tabs qty 90.

## 2021-06-28 DIAGNOSIS — I10 ESSENTIAL HYPERTENSION: ICD-10-CM

## 2021-06-28 RX ORDER — LOSARTAN POTASSIUM AND HYDROCHLOROTHIAZIDE 25; 100 MG/1; MG/1
1 TABLET ORAL DAILY
Qty: 90 TABLET | Refills: 1 | Status: SHIPPED | OUTPATIENT
Start: 2021-06-28 | End: 2022-01-17

## 2021-06-28 NOTE — TELEPHONE ENCOUNTER
Fax request from [facility] in [place], [place] for a refill on   LOSARTAN POTASSIUM-HCTZ 100-25 MG Oral Tab 90 tablet

## 2021-07-13 ENCOUNTER — OFFICE VISIT (OUTPATIENT)
Dept: INTERNAL MEDICINE CLINIC | Facility: CLINIC | Age: 66
End: 2021-07-13
Payer: COMMERCIAL

## 2021-07-13 VITALS
BODY MASS INDEX: 34.4 KG/M2 | RESPIRATION RATE: 16 BRPM | OXYGEN SATURATION: 98 % | HEIGHT: 72 IN | HEART RATE: 76 BPM | SYSTOLIC BLOOD PRESSURE: 118 MMHG | WEIGHT: 254 LBS | DIASTOLIC BLOOD PRESSURE: 74 MMHG | TEMPERATURE: 99 F

## 2021-07-13 DIAGNOSIS — Z13.220 LIPID SCREENING: ICD-10-CM

## 2021-07-13 DIAGNOSIS — Z00.00 LABORATORY EXAMINATION ORDERED AS PART OF A ROUTINE GENERAL MEDICAL EXAMINATION: ICD-10-CM

## 2021-07-13 DIAGNOSIS — Z13.29 THYROID DISORDER SCREEN: ICD-10-CM

## 2021-07-13 DIAGNOSIS — Z12.5 PROSTATE CANCER SCREENING: ICD-10-CM

## 2021-07-13 DIAGNOSIS — Z13.89 SCREENING FOR GENITOURINARY CONDITION: ICD-10-CM

## 2021-07-13 DIAGNOSIS — Z12.11 COLON CANCER SCREENING: ICD-10-CM

## 2021-07-13 DIAGNOSIS — Z00.00 ANNUAL PHYSICAL EXAM: Primary | ICD-10-CM

## 2021-07-13 DIAGNOSIS — Z13.0 SCREENING, IRON DEFICIENCY ANEMIA: ICD-10-CM

## 2021-07-13 DIAGNOSIS — G56.01 CARPAL TUNNEL SYNDROME OF RIGHT WRIST: ICD-10-CM

## 2021-07-13 PROCEDURE — 99397 PER PM REEVAL EST PAT 65+ YR: CPT | Performed by: INTERNAL MEDICINE

## 2021-07-13 PROCEDURE — 3008F BODY MASS INDEX DOCD: CPT | Performed by: INTERNAL MEDICINE

## 2021-07-13 PROCEDURE — 3074F SYST BP LT 130 MM HG: CPT | Performed by: INTERNAL MEDICINE

## 2021-07-13 PROCEDURE — 3078F DIAST BP <80 MM HG: CPT | Performed by: INTERNAL MEDICINE

## 2021-07-13 NOTE — PROGRESS NOTES
HPI/Subjective:   Patient ID: Neris Aguilera is a 78year old male. HPI  Neris Aguilera is a 78year old male who presents for a complete physical exam.   HPI:   Pt complains of right regalado numbness . Worsening.  Sxs for years  Otherwise Normal state of he PSA     Current Outpatient Medications   Medication Sig Dispense Refill   • Losartan Potassium-HCTZ 100-25 MG Oral Tab Take 1 tablet by mouth daily. 90 tablet 1   • Rosuvastatin Calcium 10 MG Oral Tab Take 1 tablet (10 mg total) by mouth daily.  90 tablet 1 exertion  CARDIOVASCULAR: denies chest pain on exertion  GI: denies abdominal pain,denies heartburn  : denies nocturia or changes in stream  MUSCULOSKELETAL: denies back pain  NEURO: denies headaches  PSYCHE: denies depression or anxiety  HEMATOLOGIC: de Potassium-HCTZ 100-25 MG Oral Tab Take 1 tablet by mouth daily. 90 tablet 1   • Rosuvastatin Calcium 10 MG Oral Tab Take 1 tablet (10 mg total) by mouth daily.  90 tablet 1   • Metoprolol Tartrate 50 MG Oral Tab Take 0.5 tablets (25 mg total) by mouth 2 (tw

## 2021-07-22 ENCOUNTER — TELEPHONE (OUTPATIENT)
Dept: SURGERY | Facility: CLINIC | Age: 66
End: 2021-07-22

## 2021-07-22 NOTE — TELEPHONE ENCOUNTER
Per Tiburcio Solis calling to clarify ct order. Per Dmitry Wang want a ct of chest with and without contrast and does he want the abdomen.  Please advise

## 2021-07-23 NOTE — TELEPHONE ENCOUNTER
I spoke to Britney Cordova from [employer]. She states that for a chest CT they only do it with contrast. I discussed that this is okay. She verbalized understanding and all questions were answered.

## 2021-07-26 ENCOUNTER — HOSPITAL ENCOUNTER (OUTPATIENT)
Dept: CT IMAGING | Facility: HOSPITAL | Age: 66
Discharge: HOME | End: 2021-07-26
Attending: UROLOGY
Payer: COMMERCIAL

## 2021-07-26 ENCOUNTER — LAB ENCOUNTER (OUTPATIENT)
Dept: LAB | Facility: HOSPITAL | Age: 66
End: 2021-07-26
Attending: UROLOGY
Payer: COMMERCIAL

## 2021-07-26 DIAGNOSIS — Z13.89 SCREENING FOR GENITOURINARY CONDITION: ICD-10-CM

## 2021-07-26 DIAGNOSIS — Z00.00 LABORATORY EXAMINATION ORDERED AS PART OF A ROUTINE GENERAL MEDICAL EXAMINATION: ICD-10-CM

## 2021-07-26 DIAGNOSIS — Z13.29 THYROID DISORDER SCREEN: ICD-10-CM

## 2021-07-26 DIAGNOSIS — C64.2 RENAL CANCER, LEFT (HCC): ICD-10-CM

## 2021-07-26 DIAGNOSIS — Z13.220 LIPID SCREENING: ICD-10-CM

## 2021-07-26 DIAGNOSIS — Z12.5 PROSTATE CANCER SCREENING: ICD-10-CM

## 2021-07-26 DIAGNOSIS — Z13.0 SCREENING, IRON DEFICIENCY ANEMIA: ICD-10-CM

## 2021-07-26 LAB
ALBUMIN SERPL-MCNC: 3.6 G/DL (ref 3.4–5)
ALBUMIN/GLOB SERPL: 1 {RATIO} (ref 1–2)
ALP LIVER SERPL-CCNC: 57 U/L
ALT SERPL-CCNC: 28 U/L
ANION GAP SERPL CALC-SCNC: 6 MMOL/L (ref 0–18)
AST SERPL-CCNC: 18 U/L (ref 15–37)
BASOPHILS # BLD AUTO: 0.05 X10(3) UL (ref 0–0.2)
BASOPHILS NFR BLD AUTO: 0.6 %
BILIRUB SERPL-MCNC: 0.7 MG/DL (ref 0.1–2)
BILIRUB UR QL STRIP.AUTO: NEGATIVE
BUN BLD-MCNC: 18 MG/DL (ref 7–18)
BUN/CREAT SERPL: 14.2 (ref 10–20)
CALCIUM BLD-MCNC: 8.8 MG/DL (ref 8.5–10.1)
CHLORIDE SERPL-SCNC: 108 MMOL/L (ref 98–112)
CHOLEST SMN-MCNC: 102 MG/DL (ref ?–200)
CLARITY UR REFRACT.AUTO: CLEAR
CO2 SERPL-SCNC: 27 MMOL/L (ref 21–32)
COLOR UR AUTO: YELLOW
COMPLEXED PSA SERPL-MCNC: 2.79 NG/ML (ref ?–4)
CREAT BLD-MCNC: 1.27 MG/DL
CREAT BLD-MCNC: 1.4 MG/DL
DEPRECATED RDW RBC AUTO: 45 FL (ref 35.1–46.3)
EOSINOPHIL # BLD AUTO: 0.09 X10(3) UL (ref 0–0.7)
EOSINOPHIL NFR BLD AUTO: 1 %
ERYTHROCYTE [DISTWIDTH] IN BLOOD BY AUTOMATED COUNT: 14.3 % (ref 11–15)
EST. AVERAGE GLUCOSE BLD GHB EST-MCNC: 123 MG/DL (ref 68–126)
GLOBULIN PLAS-MCNC: 3.6 G/DL (ref 2.8–4.4)
GLUCOSE BLD-MCNC: 101 MG/DL (ref 70–99)
GLUCOSE UR STRIP.AUTO-MCNC: NEGATIVE MG/DL
HBA1C MFR BLD HPLC: 5.9 % (ref ?–5.7)
HCT VFR BLD AUTO: 46.5 %
HDLC SERPL-MCNC: 35 MG/DL (ref 40–59)
HGB BLD-MCNC: 15.8 G/DL
IMM GRANULOCYTES # BLD AUTO: 0.04 X10(3) UL (ref 0–1)
IMM GRANULOCYTES NFR BLD: 0.5 %
KETONES UR STRIP.AUTO-MCNC: NEGATIVE MG/DL
LDLC SERPL CALC-MCNC: 48 MG/DL (ref ?–100)
LEUKOCYTE ESTERASE UR QL STRIP.AUTO: NEGATIVE
LYMPHOCYTES # BLD AUTO: 1.55 X10(3) UL (ref 1–4)
LYMPHOCYTES NFR BLD AUTO: 17.7 %
M PROTEIN MFR SERPL ELPH: 7.2 G/DL (ref 6.4–8.2)
MCH RBC QN AUTO: 29.5 PG (ref 26–34)
MCHC RBC AUTO-ENTMCNC: 34 G/DL (ref 31–37)
MCV RBC AUTO: 86.9 FL
MONOCYTES # BLD AUTO: 0.69 X10(3) UL (ref 0.1–1)
MONOCYTES NFR BLD AUTO: 7.9 %
NEUTROPHILS # BLD AUTO: 6.33 X10 (3) UL (ref 1.5–7.7)
NEUTROPHILS # BLD AUTO: 6.33 X10(3) UL (ref 1.5–7.7)
NEUTROPHILS NFR BLD AUTO: 72.3 %
NITRITE UR QL STRIP.AUTO: NEGATIVE
NONHDLC SERPL-MCNC: 67 MG/DL (ref ?–130)
OSMOLALITY SERPL CALC.SUM OF ELEC: 294 MOSM/KG (ref 275–295)
PATIENT FASTING Y/N/NP: YES
PATIENT FASTING Y/N/NP: YES
PH UR STRIP.AUTO: 5 [PH] (ref 5–8)
PLATELET # BLD AUTO: 228 10(3)UL (ref 150–450)
POTASSIUM SERPL-SCNC: 3.9 MMOL/L (ref 3.5–5.1)
PROT UR STRIP.AUTO-MCNC: NEGATIVE MG/DL
RBC # BLD AUTO: 5.35 X10(6)UL
RBC UR QL AUTO: NEGATIVE
SODIUM SERPL-SCNC: 141 MMOL/L (ref 136–145)
SP GR UR STRIP.AUTO: 1.02 (ref 1–1.03)
TRIGL SERPL-MCNC: 96 MG/DL (ref 30–149)
TSI SER-ACNC: 2.34 MIU/ML (ref 0.36–3.74)
UROBILINOGEN UR STRIP.AUTO-MCNC: <2 MG/DL
VLDLC SERPL CALC-MCNC: 14 MG/DL (ref 0–30)
WBC # BLD AUTO: 8.8 X10(3) UL (ref 4–11)

## 2021-07-26 PROCEDURE — 82565 ASSAY OF CREATININE: CPT

## 2021-07-26 PROCEDURE — 36415 COLL VENOUS BLD VENIPUNCTURE: CPT

## 2021-07-26 PROCEDURE — 85025 COMPLETE CBC W/AUTO DIFF WBC: CPT

## 2021-07-26 PROCEDURE — 84443 ASSAY THYROID STIM HORMONE: CPT

## 2021-07-26 PROCEDURE — 80061 LIPID PANEL: CPT

## 2021-07-26 PROCEDURE — 71260 CT THORAX DX C+: CPT | Performed by: UROLOGY

## 2021-07-26 PROCEDURE — 74170 CT ABD WO CNTRST FLWD CNTRST: CPT | Performed by: UROLOGY

## 2021-07-26 PROCEDURE — 81001 URINALYSIS AUTO W/SCOPE: CPT

## 2021-07-26 PROCEDURE — 83036 HEMOGLOBIN GLYCOSYLATED A1C: CPT

## 2021-07-26 PROCEDURE — 80053 COMPREHEN METABOLIC PANEL: CPT

## 2021-07-28 ENCOUNTER — OFFICE VISIT (OUTPATIENT)
Dept: SURGERY | Facility: CLINIC | Age: 66
End: 2021-07-28
Payer: COMMERCIAL

## 2021-07-28 DIAGNOSIS — N13.8 BPH WITH OBSTRUCTION/LOWER URINARY TRACT SYMPTOMS: ICD-10-CM

## 2021-07-28 DIAGNOSIS — R82.90 URINE FINDING: Primary | ICD-10-CM

## 2021-07-28 DIAGNOSIS — N52.9 ERECTILE DYSFUNCTION, UNSPECIFIED ERECTILE DYSFUNCTION TYPE: ICD-10-CM

## 2021-07-28 DIAGNOSIS — N40.1 BPH WITH OBSTRUCTION/LOWER URINARY TRACT SYMPTOMS: ICD-10-CM

## 2021-07-28 DIAGNOSIS — C64.2 RENAL CANCER, LEFT (HCC): ICD-10-CM

## 2021-07-28 LAB
APPEARANCE: CLEAR
BILIRUBIN: NEGATIVE
GLUCOSE (URINE DIPSTICK): NEGATIVE MG/DL
KETONES (URINE DIPSTICK): NEGATIVE MG/DL
LEUKOCYTES: NEGATIVE
MULTISTIX LOT#: NORMAL NUMERIC
NITRITE, URINE: NEGATIVE
OCCULT BLOOD: NEGATIVE
PH, URINE: 7 (ref 4.5–8)
SPECIFIC GRAVITY: 1.02 (ref 1–1.03)
URINE-COLOR: YELLOW
UROBILINOGEN,SEMI-QN: 1 MG/DL (ref 0–1.9)

## 2021-07-28 PROCEDURE — 99214 OFFICE O/P EST MOD 30 MIN: CPT | Performed by: UROLOGY

## 2021-07-28 PROCEDURE — 81003 URINALYSIS AUTO W/O SCOPE: CPT | Performed by: UROLOGY

## 2021-07-28 NOTE — PROGRESS NOTES
HPI:     Audrey Raya is a 78year old male with a PMH of HTN, HLD, CAD/MI, stage III CKD, LBP. Following for:  1.  1.8 x 1.5 cm LUP bx proven RCC  -  S/p RFA on 8/12/20. 2. BPH with mild LUTS - no meds. He presents today for f/u.  Also wants t pole exophytic mass concerning for RCC. UA is negative and PVR was 4 mL on renal US.     Gross hematuria: none  Tobacco hx: none  Kidney stone hx: none    HISTORY:  Past Medical History:   Diagnosis Date   • Acute MI ([facility] [address]    • Alopecia    • Chronic sinu 10 point review of systems was completed. Pertinent positives and negatives noted in the the HPI.     PHYSICAL EXAM:     GENERAL APPEARANCE: well, developed, well nourished, in no acute distress  NEUROLOGIC: nonfocal, alert and oriented  HEAD: normocephali

## 2021-08-19 ENCOUNTER — TELEPHONE (OUTPATIENT)
Dept: INTERNAL MEDICINE CLINIC | Facility: CLINIC | Age: 66
End: 2021-08-19

## 2021-08-19 ENCOUNTER — OFFICE VISIT (OUTPATIENT)
Dept: ORTHOPEDICS CLINIC | Facility: CLINIC | Age: 66
End: 2021-08-19
Payer: COMMERCIAL

## 2021-08-19 VITALS — OXYGEN SATURATION: 99 % | HEART RATE: 74 BPM

## 2021-08-19 DIAGNOSIS — G56.01 CARPAL TUNNEL SYNDROME OF RIGHT WRIST: Primary | ICD-10-CM

## 2021-08-19 PROCEDURE — 99203 OFFICE O/P NEW LOW 30 MIN: CPT | Performed by: ORTHOPAEDIC SURGERY

## 2021-08-19 NOTE — TELEPHONE ENCOUNTER
Pt does not meet requirements for COVID booster vaccination as he is not immunocompromised and he is not taking any immunosuppressive therapy. Pt aware, verbalized understanding.

## 2021-09-08 DIAGNOSIS — I10 ESSENTIAL HYPERTENSION: ICD-10-CM

## 2021-09-08 DIAGNOSIS — E78.2 MIXED HYPERLIPIDEMIA: ICD-10-CM

## 2021-09-08 RX ORDER — ROSUVASTATIN CALCIUM 10 MG/1
TABLET, COATED ORAL
Qty: 90 TABLET | Refills: 1 | OUTPATIENT
Start: 2021-09-08

## 2021-09-08 RX ORDER — METOPROLOL TARTRATE 50 MG/1
TABLET, FILM COATED ORAL
Qty: 90 TABLET | Refills: 1 | OUTPATIENT
Start: 2021-09-08

## 2021-09-24 ENCOUNTER — TELEPHONE (OUTPATIENT)
Dept: INTERNAL MEDICINE CLINIC | Facility: CLINIC | Age: 66
End: 2021-09-24

## 2021-09-24 NOTE — TELEPHONE ENCOUNTER
INEZ MARCIALOB. D/w pt at this time we do not have guidance from [facility], which we should be getting next week on who and when it is appropriate to get a booster shot.

## 2021-09-24 NOTE — TELEPHONE ENCOUNTER
Patient saw on the news that the elderly might be able to get the booster shot.     Please call to advise

## 2021-09-27 NOTE — H&P
Clinic Note [facility]     Assessment/Plan:  78year old male    1. Right carpal tunnel syndrome–symptoms are fairly classic for carpal tunnel syndrome but will obtain a EMG/NCV to better characterize the severity of his symptoms.   He does have some • High blood pressure    • High cholesterol    • Lumbago    • May-Thurner syndrome 12/31/2020   • Renal disorder     CKD   • Sinusitis    • Visual impairment     glasses     Past Surgical History:   Procedure Laterality Date   • CATH BARE METAL STENT (BM headedness, headaches  Psych: anxious, depressed, anger issues          Penelope Cotter MD  Hand, Wrist, & Elbow Surgery  [facility]  [facility] [address], [address] [facility], [email or web address]. org  [email or web address]  t: [phone]  f: 6

## 2021-10-10 DIAGNOSIS — E78.2 MIXED HYPERLIPIDEMIA: ICD-10-CM

## 2021-10-10 RX ORDER — ROSUVASTATIN CALCIUM 10 MG/1
TABLET, COATED ORAL
Qty: 90 TABLET | Refills: 1 | Status: SHIPPED | OUTPATIENT
Start: 2021-10-10

## 2022-01-17 DIAGNOSIS — I10 ESSENTIAL HYPERTENSION: ICD-10-CM

## 2022-01-17 RX ORDER — LOSARTAN POTASSIUM AND HYDROCHLOROTHIAZIDE 25; 100 MG/1; MG/1
1 TABLET ORAL DAILY
Qty: 90 TABLET | Refills: 1 | Status: SHIPPED | OUTPATIENT
Start: 2022-01-17

## 2022-01-17 NOTE — TELEPHONE ENCOUNTER
Last time medication was refilled 6/28/21  Quantity and # of refills 90 tab w/ 1 refill  Last OV 7/13/21  Next OV physical due 7/13/22

## 2022-01-29 DIAGNOSIS — I10 ESSENTIAL HYPERTENSION: ICD-10-CM

## 2022-01-30 RX ORDER — METOPROLOL TARTRATE 50 MG/1
TABLET, FILM COATED ORAL
Qty: 90 TABLET | Refills: 1 | Status: SHIPPED | OUTPATIENT
Start: 2022-01-30

## 2022-03-07 DIAGNOSIS — E78.2 MIXED HYPERLIPIDEMIA: ICD-10-CM

## 2022-03-07 RX ORDER — ROSUVASTATIN CALCIUM 10 MG/1
TABLET, COATED ORAL
Qty: 90 TABLET | Refills: 1 | Status: SHIPPED | OUTPATIENT
Start: 2022-03-07 | End: 2022-09-05

## 2022-03-29 ENCOUNTER — TELEPHONE (OUTPATIENT)
Dept: INTERNAL MEDICINE CLINIC | Facility: CLINIC | Age: 67
End: 2022-03-29

## 2022-03-29 NOTE — TELEPHONE ENCOUNTER
Patient notified that we do recommend the fourth Covid vaccine. Patient had third dose on 10/2/21. Would like to wait until May to receive additional booster. Notified patient, he technically is not due for booster until the beginning of April. Patient requesting location options for booster vaccine, notified patient to contact his [facility] as they may have the fourth vaccine or other location suggestions. Patient verbalized understanding.

## 2022-03-29 NOTE — TELEPHONE ENCOUNTER
Pt heard on the news that the FDA just approved second booster of PFIZER vaccine. Should pt be getting another booster?

## 2022-06-05 DIAGNOSIS — I10 ESSENTIAL HYPERTENSION: ICD-10-CM

## 2022-06-06 RX ORDER — METOPROLOL TARTRATE 50 MG/1
TABLET, FILM COATED ORAL
Qty: 90 TABLET | Refills: 1 | Status: SHIPPED | OUTPATIENT
Start: 2022-06-06

## 2022-06-06 RX ORDER — LOSARTAN POTASSIUM AND HYDROCHLOROTHIAZIDE 25; 100 MG/1; MG/1
1 TABLET ORAL DAILY
Qty: 90 TABLET | Refills: 1 | Status: SHIPPED | OUTPATIENT
Start: 2022-06-06

## 2022-07-14 ENCOUNTER — OFFICE VISIT (OUTPATIENT)
Dept: INTERNAL MEDICINE CLINIC | Facility: CLINIC | Age: 67
End: 2022-07-14
Payer: COMMERCIAL

## 2022-07-14 ENCOUNTER — MED REC SCAN ONLY (OUTPATIENT)
Dept: INTERNAL MEDICINE CLINIC | Facility: CLINIC | Age: 67
End: 2022-07-14

## 2022-07-14 VITALS
TEMPERATURE: 98 F | RESPIRATION RATE: 16 BRPM | OXYGEN SATURATION: 97 % | BODY MASS INDEX: 34.54 KG/M2 | SYSTOLIC BLOOD PRESSURE: 126 MMHG | DIASTOLIC BLOOD PRESSURE: 78 MMHG | HEART RATE: 74 BPM | WEIGHT: 255 LBS | HEIGHT: 72 IN

## 2022-07-14 DIAGNOSIS — Z13.220 LIPID SCREENING: ICD-10-CM

## 2022-07-14 DIAGNOSIS — Z13.0 SCREENING, IRON DEFICIENCY ANEMIA: ICD-10-CM

## 2022-07-14 DIAGNOSIS — Z13.29 THYROID DISORDER SCREEN: ICD-10-CM

## 2022-07-14 DIAGNOSIS — Z12.11 COLON CANCER SCREENING: ICD-10-CM

## 2022-07-14 DIAGNOSIS — Z00.00 LABORATORY EXAMINATION ORDERED AS PART OF A ROUTINE GENERAL MEDICAL EXAMINATION: ICD-10-CM

## 2022-07-14 DIAGNOSIS — Z12.5 PROSTATE CANCER SCREENING: ICD-10-CM

## 2022-07-14 DIAGNOSIS — Z13.89 SCREENING FOR GENITOURINARY CONDITION: ICD-10-CM

## 2022-07-14 DIAGNOSIS — Z00.00 ANNUAL PHYSICAL EXAM: Primary | ICD-10-CM

## 2022-07-14 PROCEDURE — 3074F SYST BP LT 130 MM HG: CPT | Performed by: INTERNAL MEDICINE

## 2022-07-14 PROCEDURE — 3008F BODY MASS INDEX DOCD: CPT | Performed by: INTERNAL MEDICINE

## 2022-07-14 PROCEDURE — 3078F DIAST BP <80 MM HG: CPT | Performed by: INTERNAL MEDICINE

## 2022-07-14 PROCEDURE — 99397 PER PM REEVAL EST PAT 65+ YR: CPT | Performed by: INTERNAL MEDICINE

## 2022-07-20 ENCOUNTER — TELEPHONE (OUTPATIENT)
Dept: SURGERY | Facility: CLINIC | Age: 67
End: 2022-07-20

## 2022-07-20 NOTE — TELEPHONE ENCOUNTER
RN called patient to remind him to complete CT prior to his follow up appt with Dr Juan Solorio on 7/27 Wed at 10:45 AM. No answer. Left message.

## 2022-07-20 NOTE — TELEPHONE ENCOUNTER
Please call this patient and remind them to get the following tests prior to their upcoming appointment.    - CT    If they can't get this done that's fine. Will still see them as scheduled.     Thanks,  MPH

## 2022-08-08 ENCOUNTER — TELEPHONE (OUTPATIENT)
Dept: SURGERY | Facility: CLINIC | Age: 67
End: 2022-08-08

## 2022-08-08 NOTE — TELEPHONE ENCOUNTER
This RN called patientVincent [phone]   In response to his call. No answer. Left message. Note, RN called patient on 7/20 to remind him to complete his CT prior to his office visit with Dr Angelo Louie on 7/27. Patient completed CT on 7/26 but cancelled his follow up appt. Now, patient scheduled to complete CT (no contrast) on 9/10 and he is scheduled for follow up with Dr Angelo Louie on 10/24. * Patient may have to reschedule appt after completing CT on 9/10.

## 2022-08-08 NOTE — TELEPHONE ENCOUNTER
Per pt was told order for CT has been cancelled and advised to contact office for new one. Pt is scheduled for 9/10/22.  Please advise, OK to leave detailed VM

## 2022-08-09 ENCOUNTER — TELEPHONE (OUTPATIENT)
Dept: SURGERY | Facility: CLINIC | Age: 67
End: 2022-08-09

## 2022-08-09 ENCOUNTER — TELEPHONE (OUTPATIENT)
Dept: INTERNAL MEDICINE CLINIC | Facility: CLINIC | Age: 67
End: 2022-08-09

## 2022-08-09 NOTE — TELEPHONE ENCOUNTER
Per Marimar Reyes, order for CT will  before scheduled appt. Asking for new order to be placed.  Please advise

## 2022-08-09 NOTE — TELEPHONE ENCOUNTER
Spoke with Brook Abdi at [email or web address] to notify ordering provider to reorder the test  understanding verbalized

## 2022-08-15 ENCOUNTER — LABORATORY ENCOUNTER (OUTPATIENT)
Dept: LAB | Age: 67
End: 2022-08-15
Attending: INTERNAL MEDICINE
Payer: COMMERCIAL

## 2022-08-15 DIAGNOSIS — Z13.29 THYROID DISORDER SCREEN: ICD-10-CM

## 2022-08-15 DIAGNOSIS — Z13.220 LIPID SCREENING: ICD-10-CM

## 2022-08-15 DIAGNOSIS — Z00.00 LABORATORY EXAMINATION ORDERED AS PART OF A ROUTINE GENERAL MEDICAL EXAMINATION: ICD-10-CM

## 2022-08-15 DIAGNOSIS — Z12.5 PROSTATE CANCER SCREENING: ICD-10-CM

## 2022-08-15 DIAGNOSIS — Z13.0 SCREENING, IRON DEFICIENCY ANEMIA: ICD-10-CM

## 2022-08-15 DIAGNOSIS — Z13.89 SCREENING FOR GENITOURINARY CONDITION: ICD-10-CM

## 2022-08-15 LAB
ALBUMIN SERPL-MCNC: 3.5 G/DL (ref 3.4–5)
ALBUMIN/GLOB SERPL: 1 {RATIO} (ref 1–2)
ALP LIVER SERPL-CCNC: 55 U/L
ALT SERPL-CCNC: 29 U/L
ANION GAP SERPL CALC-SCNC: 6 MMOL/L (ref 0–18)
AST SERPL-CCNC: 25 U/L (ref 15–37)
BASOPHILS # BLD AUTO: 0.05 X10(3) UL (ref 0–0.2)
BASOPHILS NFR BLD AUTO: 0.6 %
BILIRUB SERPL-MCNC: 0.8 MG/DL (ref 0.1–2)
BILIRUB UR QL STRIP.AUTO: NEGATIVE
BUN BLD-MCNC: 19 MG/DL (ref 7–18)
CALCIUM BLD-MCNC: 9 MG/DL (ref 8.5–10.1)
CHLORIDE SERPL-SCNC: 109 MMOL/L (ref 98–112)
CHOLEST SERPL-MCNC: 101 MG/DL (ref ?–200)
CLARITY UR REFRACT.AUTO: CLEAR
CO2 SERPL-SCNC: 25 MMOL/L (ref 21–32)
COLOR UR AUTO: YELLOW
COMPLEXED PSA SERPL-MCNC: 3.31 NG/ML (ref ?–4)
CREAT BLD-MCNC: 1.35 MG/DL
EOSINOPHIL # BLD AUTO: 0.07 X10(3) UL (ref 0–0.7)
EOSINOPHIL NFR BLD AUTO: 0.9 %
ERYTHROCYTE [DISTWIDTH] IN BLOOD BY AUTOMATED COUNT: 14.6 %
EST. AVERAGE GLUCOSE BLD GHB EST-MCNC: 117 MG/DL (ref 68–126)
FASTING PATIENT LIPID ANSWER: YES
FASTING STATUS PATIENT QL REPORTED: YES
GFR SERPLBLD BASED ON 1.73 SQ M-ARVRAT: 57 ML/MIN/1.73M2 (ref 60–?)
GLOBULIN PLAS-MCNC: 3.6 G/DL (ref 2.8–4.4)
GLUCOSE BLD-MCNC: 105 MG/DL (ref 70–99)
GLUCOSE UR STRIP.AUTO-MCNC: NEGATIVE MG/DL
HBA1C MFR BLD: 5.7 % (ref ?–5.7)
HCT VFR BLD AUTO: 49.2 %
HDLC SERPL-MCNC: 37 MG/DL (ref 40–59)
HGB BLD-MCNC: 16.1 G/DL
IMM GRANULOCYTES # BLD AUTO: 0.03 X10(3) UL (ref 0–1)
IMM GRANULOCYTES NFR BLD: 0.4 %
KETONES UR STRIP.AUTO-MCNC: NEGATIVE MG/DL
LDLC SERPL CALC-MCNC: 48 MG/DL (ref ?–100)
LEUKOCYTE ESTERASE UR QL STRIP.AUTO: NEGATIVE
LYMPHOCYTES # BLD AUTO: 1.43 X10(3) UL (ref 1–4)
LYMPHOCYTES NFR BLD AUTO: 17.6 %
MCH RBC QN AUTO: 28.9 PG (ref 26–34)
MCHC RBC AUTO-ENTMCNC: 32.7 G/DL (ref 31–37)
MCV RBC AUTO: 88.2 FL
MONOCYTES # BLD AUTO: 0.61 X10(3) UL (ref 0.1–1)
MONOCYTES NFR BLD AUTO: 7.5 %
NEUTROPHILS # BLD AUTO: 5.92 X10 (3) UL (ref 1.5–7.7)
NEUTROPHILS # BLD AUTO: 5.92 X10(3) UL (ref 1.5–7.7)
NEUTROPHILS NFR BLD AUTO: 73 %
NITRITE UR QL STRIP.AUTO: NEGATIVE
NONHDLC SERPL-MCNC: 64 MG/DL (ref ?–130)
OSMOLALITY SERPL CALC.SUM OF ELEC: 293 MOSM/KG (ref 275–295)
PH UR STRIP.AUTO: 5 [PH] (ref 5–8)
PLATELET # BLD AUTO: 222 10(3)UL (ref 150–450)
POTASSIUM SERPL-SCNC: 3.9 MMOL/L (ref 3.5–5.1)
PROT SERPL-MCNC: 7.1 G/DL (ref 6.4–8.2)
PROT UR STRIP.AUTO-MCNC: NEGATIVE MG/DL
RBC # BLD AUTO: 5.58 X10(6)UL
RBC UR QL AUTO: NEGATIVE
SODIUM SERPL-SCNC: 140 MMOL/L (ref 136–145)
SP GR UR STRIP.AUTO: 1.02 (ref 1–1.03)
TRIGL SERPL-MCNC: 79 MG/DL (ref 30–149)
TSI SER-ACNC: 2.24 MIU/ML (ref 0.36–3.74)
UROBILINOGEN UR STRIP.AUTO-MCNC: <2 MG/DL
VLDLC SERPL CALC-MCNC: 11 MG/DL (ref 0–30)
WBC # BLD AUTO: 8.1 X10(3) UL (ref 4–11)

## 2022-08-15 PROCEDURE — 83036 HEMOGLOBIN GLYCOSYLATED A1C: CPT | Performed by: INTERNAL MEDICINE

## 2022-08-15 PROCEDURE — 80061 LIPID PANEL: CPT | Performed by: INTERNAL MEDICINE

## 2022-08-15 PROCEDURE — 80050 GENERAL HEALTH PANEL: CPT | Performed by: INTERNAL MEDICINE

## 2022-08-15 PROCEDURE — 84153 ASSAY OF PSA TOTAL: CPT | Performed by: INTERNAL MEDICINE

## 2022-08-15 PROCEDURE — 81001 URINALYSIS AUTO W/SCOPE: CPT | Performed by: INTERNAL MEDICINE

## 2022-09-03 DIAGNOSIS — E78.2 MIXED HYPERLIPIDEMIA: ICD-10-CM

## 2022-09-05 RX ORDER — ROSUVASTATIN CALCIUM 10 MG/1
TABLET, COATED ORAL
Qty: 90 TABLET | Refills: 1 | Status: SHIPPED | OUTPATIENT
Start: 2022-09-05 | End: 2023-03-30

## 2022-09-10 ENCOUNTER — HOSPITAL ENCOUNTER (OUTPATIENT)
Dept: CT IMAGING | Facility: HOSPITAL | Age: 67
Discharge: HOME | End: 2022-09-10
Attending: UROLOGY
Payer: COMMERCIAL

## 2022-09-10 DIAGNOSIS — C64.2 RENAL CANCER, LEFT (HCC): ICD-10-CM

## 2022-09-10 PROCEDURE — 71260 CT THORAX DX C+: CPT | Performed by: UROLOGY

## 2022-09-10 PROCEDURE — 74170 CT ABD WO CNTRST FLWD CNTRST: CPT | Performed by: UROLOGY

## 2022-09-10 RX ORDER — IOHEXOL 350 MG/ML
100 INJECTION, SOLUTION INTRAVENOUS
Status: COMPLETED | OUTPATIENT
Start: 2022-09-10 | End: 2022-09-10

## 2022-09-10 RX ADMIN — IOHEXOL 100 ML: 350 INJECTION, SOLUTION INTRAVENOUS at 08:10:00

## 2022-09-12 NOTE — PROGRESS NOTES
Results reviewed. Patient's CT is back. It looks like he missed his last appt. I'm fine with you squeezing him in over lunch sometime in the next few days if he wants to come in for appt.     Thanks,  MPH

## 2022-09-16 ENCOUNTER — TELEPHONE (OUTPATIENT)
Dept: SURGERY | Facility: CLINIC | Age: 67
End: 2022-09-16

## 2022-09-16 NOTE — TELEPHONE ENCOUNTER
LMTCB      ----- Message from Abdoul Fuchs MD sent at 9/12/2022 12:15 PM CDT -----  Results reviewed. Patient's CT is back. It looks like he missed his last appt. I'm fine with you squeezing him in over lunch sometime in the next few days if he wants to come in for appt.     Thanks,  MPH

## 2022-09-19 NOTE — TELEPHONE ENCOUNTER
I called the pt and discussed message form MPH:    \"----- Message from Shawnee Duong MD sent at 9/12/2022 12:15 PM CDT -----  Results reviewed. Patient's CT is back. It looks like he missed his last appt. I'm fine with you squeezing him in over lunch sometime in the next few days if he wants to come in for appt. \"    I offered sooner appt and Pt would prefer to keep appt scheduled on 10/24/2022.

## 2022-10-06 NOTE — H&P
[address] Patient Status:  Outpatient    1955 MRN [id number]   [facility] CT Attending Tonny Whyte MD   Hosp Day # 0 PCP Jelena Zhang MD     Admitting Diagnosis:   Renal lesion    History 81 MG Oral Tab, Take 81 mg by mouth daily. , Disp: , Rfl:   •  Multiple Vitamin (MULTI-VITAMIN OR), Take 1 tablet by mouth daily.   , Disp: , Rfl:     Physical Exam & Review of Systems:   Physical Exam:    /80 (BP Location: Right arm)   Pulse 58   Temp No

## 2022-10-12 ENCOUNTER — TELEPHONE (OUTPATIENT)
Dept: INTERNAL MEDICINE CLINIC | Facility: CLINIC | Age: 67
End: 2022-10-12

## 2022-10-12 NOTE — TELEPHONE ENCOUNTER
Would like Dr. Kassy Dudley thoughts on if he needs to get the updated covid booster    Per pt ok to leave message if we can not reach him

## 2022-10-12 NOTE — TELEPHONE ENCOUNTER
Last [place] booster on 04/23/22. LVM notifying patient that all providers at our office recommend the new Covid Bivalent vaccination and patient does qualify as it has been greater than two months since his last vaccination. Informed patient via VM our office does not offer the vaccination but local pharmacies, such as patient's preferred [facility] pharmacy do offer the Bivalent vaccination. Notified [facility] with questions or concerns.

## 2022-10-24 ENCOUNTER — OFFICE VISIT (OUTPATIENT)
Dept: SURGERY | Facility: CLINIC | Age: 67
End: 2022-10-24
Payer: MEDICARE

## 2022-10-24 DIAGNOSIS — N40.1 BPH WITH OBSTRUCTION/LOWER URINARY TRACT SYMPTOMS: ICD-10-CM

## 2022-10-24 DIAGNOSIS — N13.8 BPH WITH OBSTRUCTION/LOWER URINARY TRACT SYMPTOMS: ICD-10-CM

## 2022-10-24 DIAGNOSIS — N52.9 ERECTILE DYSFUNCTION, UNSPECIFIED ERECTILE DYSFUNCTION TYPE: ICD-10-CM

## 2022-10-24 DIAGNOSIS — C64.2 RENAL CANCER, LEFT (HCC): Primary | ICD-10-CM

## 2022-10-24 LAB
APPEARANCE: CLEAR
BILIRUBIN: NEGATIVE
GLUCOSE (URINE DIPSTICK): NEGATIVE MG/DL
KETONES (URINE DIPSTICK): NEGATIVE MG/DL
LEUKOCYTES: NEGATIVE
MULTISTIX LOT#: ABNORMAL NUMERIC
NITRITE, URINE: NEGATIVE
OCCULT BLOOD: NEGATIVE
PH, URINE: 6.5 (ref 4.5–8)
PROTEIN (URINE DIPSTICK): 30 MG/DL
SPECIFIC GRAVITY: 1.02 (ref 1–1.03)
URINE-COLOR: YELLOW
UROBILINOGEN,SEMI-QN: 1 MG/DL (ref 0–1.9)

## 2022-10-24 PROCEDURE — 81003 URINALYSIS AUTO W/O SCOPE: CPT | Performed by: UROLOGY

## 2022-10-24 PROCEDURE — 99214 OFFICE O/P EST MOD 30 MIN: CPT | Performed by: UROLOGY

## 2022-11-01 NOTE — PROGRESS NOTES
Received pt at 1800 from cath lab s/p thrombectomy/ 8 fr fountain cath placed to left popliteal vein tpa infusing at 1 mg/hr (5mg/500) and heparin through side port at 100 units/ hr ( 1000 units/500) site has small serosanguinous ooze  On arrival which has No

## 2022-12-02 DIAGNOSIS — I10 ESSENTIAL HYPERTENSION: ICD-10-CM

## 2022-12-02 RX ORDER — LOSARTAN POTASSIUM AND HYDROCHLOROTHIAZIDE 25; 100 MG/1; MG/1
1 TABLET ORAL DAILY
Qty: 90 TABLET | Refills: 1 | Status: SHIPPED | OUTPATIENT
Start: 2022-12-02

## 2023-01-09 ENCOUNTER — OFFICE VISIT (OUTPATIENT)
Dept: INTERNAL MEDICINE CLINIC | Facility: CLINIC | Age: 68
End: 2023-01-09
Payer: COMMERCIAL

## 2023-01-09 VITALS
HEART RATE: 76 BPM | RESPIRATION RATE: 16 BRPM | SYSTOLIC BLOOD PRESSURE: 128 MMHG | HEIGHT: 72 IN | DIASTOLIC BLOOD PRESSURE: 76 MMHG | OXYGEN SATURATION: 95 % | BODY MASS INDEX: 33.86 KG/M2 | WEIGHT: 250 LBS | TEMPERATURE: 98 F

## 2023-01-09 DIAGNOSIS — Z12.11 COLON CANCER SCREENING: ICD-10-CM

## 2023-01-09 DIAGNOSIS — I10 ESSENTIAL HYPERTENSION: Primary | ICD-10-CM

## 2023-01-09 PROCEDURE — 1126F AMNT PAIN NOTED NONE PRSNT: CPT | Performed by: INTERNAL MEDICINE

## 2023-01-09 PROCEDURE — 99213 OFFICE O/P EST LOW 20 MIN: CPT | Performed by: INTERNAL MEDICINE

## 2023-03-30 ENCOUNTER — TELEPHONE (OUTPATIENT)
Dept: INTERNAL MEDICINE CLINIC | Facility: CLINIC | Age: 68
End: 2023-03-30

## 2023-03-30 DIAGNOSIS — E78.2 MIXED HYPERLIPIDEMIA: ICD-10-CM

## 2023-03-30 RX ORDER — ROSUVASTATIN CALCIUM 10 MG/1
10 TABLET, COATED ORAL DAILY
Qty: 90 TABLET | Refills: 1 | Status: SHIPPED | OUTPATIENT
Start: 2023-03-30

## 2023-03-30 NOTE — TELEPHONE ENCOUNTER
Pt is calling bc his Rx was denied and wanted to know why.  Pt is at work and won't answer immediately but LM and pt will c/b.    ROSUVASTATIN 10 MG Oral Tab 90 tab     Jay Sanchez [address], [phone], [phone]    Last OV:01/09/23  Future OV: 07/17/23

## 2023-05-31 DIAGNOSIS — I10 ESSENTIAL HYPERTENSION: ICD-10-CM

## 2023-05-31 RX ORDER — LOSARTAN POTASSIUM AND HYDROCHLOROTHIAZIDE 25; 100 MG/1; MG/1
1 TABLET ORAL DAILY
Qty: 90 TABLET | Refills: 0 | Status: SHIPPED | OUTPATIENT
Start: 2023-05-31

## 2023-08-29 DIAGNOSIS — E78.2 MIXED HYPERLIPIDEMIA: ICD-10-CM

## 2023-08-29 DIAGNOSIS — I10 ESSENTIAL HYPERTENSION: ICD-10-CM

## 2023-08-29 RX ORDER — ROSUVASTATIN CALCIUM 10 MG/1
10 TABLET, COATED ORAL DAILY
Qty: 90 TABLET | Refills: 0 | Status: SHIPPED | OUTPATIENT
Start: 2023-08-29

## 2023-08-29 RX ORDER — LOSARTAN POTASSIUM AND HYDROCHLOROTHIAZIDE 25; 100 MG/1; MG/1
1 TABLET ORAL DAILY
Qty: 90 TABLET | Refills: 0 | Status: SHIPPED | OUTPATIENT
Start: 2023-08-29

## 2023-10-24 ENCOUNTER — HOSPITAL ENCOUNTER (OUTPATIENT)
Dept: CT IMAGING | Facility: HOSPITAL | Age: 68
Discharge: HOME | End: 2023-10-24
Attending: UROLOGY

## 2023-10-24 ENCOUNTER — TELEPHONE (OUTPATIENT)
Dept: INTERNAL MEDICINE CLINIC | Facility: CLINIC | Age: 68
End: 2023-10-24

## 2023-10-24 ENCOUNTER — TELEPHONE (OUTPATIENT)
Dept: SURGERY | Facility: CLINIC | Age: 68
End: 2023-10-24

## 2023-10-24 DIAGNOSIS — C64.2 RENAL CANCER, LEFT (HCC): ICD-10-CM

## 2023-10-24 LAB
CREAT BLD-MCNC: 1.6 MG/DL
EGFRCR SERPLBLD CKD-EPI 2021: 46 ML/MIN/1.73M2 (ref 60–?)

## 2023-10-24 PROCEDURE — 82565 ASSAY OF CREATININE: CPT

## 2023-10-24 PROCEDURE — 71260 CT THORAX DX C+: CPT | Performed by: UROLOGY

## 2023-10-24 PROCEDURE — 74170 CT ABD WO CNTRST FLWD CNTRST: CPT | Performed by: UROLOGY

## 2023-10-24 NOTE — TELEPHONE ENCOUNTER
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 Patient is concerned that this could be a potential cause of his Kidney issues that developed 3 years ago. Wanted to communicate he will get his   CT at noon today and Urology appt next fri. Next appt 11/20. Pt can discuss results and details w/  At that time.

## 2023-10-24 NOTE — TELEPHONE ENCOUNTER
I reviewed. Shows possible diverticulitis. Is he having pain or GI symptoms - nausea/bloating? If so should probably go to urgent care for evaluation. If no pain we can discuss at his upcoming appointment.

## 2023-10-24 NOTE — PROGRESS NOTES
HPI:     Essence Baldwin is a 70year old male with a PMH of HTN, HLD, CAD/MI, stage III CKD, LBP. Following for:  1.  1.8 x 1.5 cm LUP renal mass (bx proven RCC)  -  S/p RFA 20  2. BPH with mild LUTS  - no meds  3. ED     PCP - Dr Guaman President 10/24/22    He presents today for f/u, review CT showing possible diverticulitis. 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 He feels well today. Exercising  min every day and losing weight. No abdominal pain, d/c. Of note, His wife  in  from metastatic breast and ovarian cancer and his daughter and grandaughter have tested positive for cancer mutation. Granddaughter graduated from nursing last year. AUA SS is 2/35 with 1/5 w, n. Mostly happy with LUTS and declines meds at this time. PRAVEEN declined  PSA 3.31 8/15/22. CT CA 10/24/23: continued decrease in size of left kidney lesion likely representing post-treatment changes  (14 x 10 mm). Possible sigmoid diverticulitis. Tiny pulmonary micronodules (~ 1 mm)  CT CA 9/10/22: continued interval decrease in size of low density lesion in the lateral cortex of LUP which does not enhance (~ 14 x 13 mm)  CT CA 21: decrease in size of left kidney mass (18 x 16 mm from 2.1 x 1.7 cm). CT A/P w/ IVC 10/21/21: ablation changes with tumor remnants which are non-enhancing. We discussed this generally indicates a successful treatment.      0% potency for years. Has not tried anything for ED and prefers observation. We again reviewed the NCCN guidelines for surveillance of SRMs following ablation, including:  - CT/MRI with and without IVC at 1-6 mo following ablative therapy, then CT/MRI (preferred) or abd US annually for 5 y or longer if indicated  - CXR or CT annually for 5 y    Discussed option to start levo/flagyl for possible diverticulitis or if no symptoms can f/u with PCP to review. Plan for f/u in 1 y with CT C/A prior.  Observation for BPH and ED. Prefers in person visits. Prior note:  He presents with a 1.8 x 1.5 cm left upper pole renal mass. He feels well today and has no complaints. He underwent renal US on 6/16/20 for proteinuria showing a 1.8 x 1.5 cm left upper pole hypoechoic renal mass for which dedicated CT was recommended. CT abdomen w/ and w/o IV contrast was performed on 6/27/20 showing a 1.5 x 1.8 x 1.9 cm left upper pole exophytic mass concerning for RCC. UA is negative and PVR was 4 mL on renal US. Gross hematuria: none  Tobacco hx: none  Kidney stone hx: none  HISTORY:  Past Medical History:   Diagnosis Date    Acute MI ([address]     Alopecia     Chronic sinusitis     Heart attack [address] [address]     11/2010    High blood pressure     High cholesterol     Lumbago     May-Thurner syndrome 12/31/2020    Renal disorder     CKD    Sinusitis     Visual impairment     glasses      Past Surgical History:   Procedure Laterality Date    CATH BARE METAL STENT (BMS)      x2    FOOT/TOES SURGERY PROC UNLISTED      foot surgery    HIP SURGERY  07/2015    OTHER SURGICAL HISTORY  2011    cath laser 1 distal L anterior Desc artery    REPAIR OF NASAL SEPTUM  1998    TOTAL HIP REPLACEMENT Left     2014      Family History   Problem Relation Age of Onset    Alcohol and Other Disorders Associated Mother     Heart Attack Father       Social History:   Social History     Socioeconomic History    Marital status:    Tobacco Use    Smoking status: Never    Smokeless tobacco: Never   Vaping Use    Vaping Use: Never used   Substance and Sexual Activity    Alcohol use: No    Drug use: No   Other Topics Concern    Caffeine Concern Yes    Exercise No        Medications (Active prior to today's visit):  Current Outpatient Medications   Medication Sig Dispense Refill    rosuvastatin 10 MG Oral Tab Take 1 tablet (10 mg total) by mouth daily. 90 tablet 0    losartan-hydroCHLOROthiazide 100-25 MG Oral Tab Take 1 tablet by mouth daily.  90 tablet 0 METOPROLOL TARTRATE 50 MG Oral Tab TAKE 1/2 TABLET(25 MG) BY MOUTH TWICE DAILY 90 tablet 1    aspirin 81 MG Oral Chew Tab Chew 81 mg by mouth daily. Ascorbic Acid (VITAMIN C) 100 MG Oral Tab Take 100 mg by mouth daily. Multiple Vitamin (MULTI-VITAMIN OR) Take 1 tablet by mouth daily. Allergies:    Tetracyclines & Rel*    DIARRHEA  Viibryd                 NAUSEA ONLY    Comment:\"TABS\"  Vilazodone Hcl          NAUSEA ONLY    Comment:\"TABS\"      ROS:     A comprehensive 10 point review of systems was completed. Pertinent positives and negatives noted in the the HPI. PHYSICAL EXAM:     GENERAL APPEARANCE: well, developed, well nourished, in no acute distress  NEUROLOGIC: nonfocal, alert and oriented  HEAD: normocephalic, atraumatic  EYES: sclera non-icteric  EARS: hearing intact  ORAL CAVITY: mucosa moist  NECK/THYROID: no obvious goiter or masses  LUNGS: nonlabored breathing  ABDOMEN: soft, no obvious masses or tenderness  SKIN: no obvious rashes    : as noted above     ASSESSMENT/PLAN:   Diagnoses and all orders for this visit:    Renal cancer, left (HCC)  -     URINALYSIS, AUTO, W/O SCOPE  -     CT CHEST (CONTRAST ONLY) ABDOMEN (W+WO) (CPT=71260/19845); Future    BPH with obstruction/lower urinary tract symptoms    Erectile dysfunction, unspecified erectile dysfunction type    - as noted above. Thanks again for this consult.     Heather Mcnamara MD, [address]  Urologist  [facility]  Office: [phone]

## 2023-10-24 NOTE — TELEPHONE ENCOUNTER
Discuss at upcoming appt  Future Appointments   Date Time Provider Lili Chowdaryi   10/24/2023 12:00 PM [address] [facility] MAIN [address] [place] [address]   11/3/2023  1:00 PM Dylan Jaramillo MD [facility] [address]   11/20/2023  2:30 PM Janett Carpenter MD [facility] [address] [facility] [address]

## 2023-10-27 NOTE — TELEPHONE ENCOUNTER
Pt returned the call.  Pt has seen the results.   Pt has an appointment on 11-3-23 with dr. Beck.  Anything you need to tell the pt can be given at the appointment.

## 2023-11-03 ENCOUNTER — OFFICE VISIT (OUTPATIENT)
Dept: SURGERY | Facility: CLINIC | Age: 68
End: 2023-11-03
Payer: MEDICARE

## 2023-11-03 DIAGNOSIS — N52.9 ERECTILE DYSFUNCTION, UNSPECIFIED ERECTILE DYSFUNCTION TYPE: ICD-10-CM

## 2023-11-03 DIAGNOSIS — N40.1 BPH WITH OBSTRUCTION/LOWER URINARY TRACT SYMPTOMS: ICD-10-CM

## 2023-11-03 DIAGNOSIS — C64.2 RENAL CANCER, LEFT (HCC): Primary | ICD-10-CM

## 2023-11-03 DIAGNOSIS — N13.8 BPH WITH OBSTRUCTION/LOWER URINARY TRACT SYMPTOMS: ICD-10-CM

## 2023-11-03 LAB
APPEARANCE: CLEAR
BILIRUBIN: NEGATIVE
GLUCOSE (URINE DIPSTICK): NEGATIVE MG/DL
KETONES (URINE DIPSTICK): NEGATIVE MG/DL
LEUKOCYTES: NEGATIVE
MULTISTIX LOT#: NORMAL NUMERIC
NITRITE, URINE: NEGATIVE
OCCULT BLOOD: NEGATIVE
PH, URINE: 6.5 (ref 4.5–8)
SPECIFIC GRAVITY: 1.01 (ref 1–1.03)
URINE-COLOR: YELLOW
UROBILINOGEN,SEMI-QN: 0.2 MG/DL (ref 0–1.9)

## 2023-11-03 PROCEDURE — 81003 URINALYSIS AUTO W/O SCOPE: CPT | Performed by: UROLOGY

## 2023-11-03 PROCEDURE — 99214 OFFICE O/P EST MOD 30 MIN: CPT | Performed by: UROLOGY

## 2023-11-20 ENCOUNTER — OFFICE VISIT (OUTPATIENT)
Dept: INTERNAL MEDICINE CLINIC | Facility: CLINIC | Age: 68
End: 2023-11-20
Payer: COMMERCIAL

## 2023-11-20 VITALS
WEIGHT: 243 LBS | HEART RATE: 88 BPM | TEMPERATURE: 98 F | BODY MASS INDEX: 32.91 KG/M2 | RESPIRATION RATE: 18 BRPM | DIASTOLIC BLOOD PRESSURE: 80 MMHG | HEIGHT: 72 IN | SYSTOLIC BLOOD PRESSURE: 128 MMHG | OXYGEN SATURATION: 97 %

## 2023-11-20 DIAGNOSIS — Z00.00 ANNUAL PHYSICAL EXAM: Primary | ICD-10-CM

## 2023-11-20 DIAGNOSIS — G62.9 NEUROPATHY: ICD-10-CM

## 2023-11-20 DIAGNOSIS — Z00.00 ROUTINE GENERAL MEDICAL EXAMINATION AT A HEALTH CARE FACILITY: ICD-10-CM

## 2023-11-20 DIAGNOSIS — Z12.5 PROSTATE CANCER SCREENING: ICD-10-CM

## 2023-11-20 DIAGNOSIS — Z12.11 COLON CANCER SCREENING: ICD-10-CM

## 2023-11-20 DIAGNOSIS — Z23 NEED FOR INFLUENZA VACCINATION: ICD-10-CM

## 2023-11-20 PROCEDURE — 3074F SYST BP LT 130 MM HG: CPT | Performed by: INTERNAL MEDICINE

## 2023-11-20 PROCEDURE — 90471 IMMUNIZATION ADMIN: CPT | Performed by: INTERNAL MEDICINE

## 2023-11-20 PROCEDURE — 3008F BODY MASS INDEX DOCD: CPT | Performed by: INTERNAL MEDICINE

## 2023-11-20 PROCEDURE — 90662 IIV NO PRSV INCREASED AG IM: CPT | Performed by: INTERNAL MEDICINE

## 2023-11-20 PROCEDURE — 99397 PER PM REEVAL EST PAT 65+ YR: CPT | Performed by: INTERNAL MEDICINE

## 2023-11-20 PROCEDURE — 3079F DIAST BP 80-89 MM HG: CPT | Performed by: INTERNAL MEDICINE

## 2023-11-27 DIAGNOSIS — I10 ESSENTIAL HYPERTENSION: ICD-10-CM

## 2023-11-27 DIAGNOSIS — E78.2 MIXED HYPERLIPIDEMIA: ICD-10-CM

## 2023-11-27 RX ORDER — ROSUVASTATIN CALCIUM 10 MG/1
10 TABLET, COATED ORAL DAILY
Qty: 90 TABLET | Refills: 0 | Status: SHIPPED | OUTPATIENT
Start: 2023-11-27

## 2023-11-27 RX ORDER — LOSARTAN POTASSIUM AND HYDROCHLOROTHIAZIDE 25; 100 MG/1; MG/1
1 TABLET ORAL DAILY
Qty: 90 TABLET | Refills: 0 | Status: SHIPPED | OUTPATIENT
Start: 2023-11-27

## 2023-11-27 NOTE — TELEPHONE ENCOUNTER
losartan-hydroCHLOROthiazide 100-25 MG Oral Tab     Last time medication was refilled 08/29/2023  Quantity and # of refills 90 w 0  Last OV 11/20/2023  Next OV No appointment scheduled        Protocol failed     rosuvastatin 10 MG Oral Tab   Last time medication was refilled 08/29/2023  Quantity and # of refills 90 w 0  Last OV 11/20/2023  Next OV No appointment scheduled    Failed protocol.      Sent to Dr. Maria Victoria Fritz for approval

## 2024-02-25 DIAGNOSIS — E78.2 MIXED HYPERLIPIDEMIA: ICD-10-CM

## 2024-02-25 DIAGNOSIS — I10 ESSENTIAL HYPERTENSION: ICD-10-CM

## 2024-02-26 RX ORDER — ROSUVASTATIN CALCIUM 10 MG/1
10 TABLET, COATED ORAL DAILY
Qty: 90 TABLET | Refills: 0 | Status: SHIPPED | OUTPATIENT
Start: 2024-02-26

## 2024-02-26 RX ORDER — LOSARTAN POTASSIUM AND HYDROCHLOROTHIAZIDE 25; 100 MG/1; MG/1
1 TABLET ORAL DAILY
Qty: 90 TABLET | Refills: 0 | Status: SHIPPED | OUTPATIENT
Start: 2024-02-26

## 2024-02-26 NOTE — TELEPHONE ENCOUNTER
Losartan-hydrochlorothiazide 100-25 MG  Last time medication was refilled 11/27/2023  Quantity and # of refills 90 w/ 0  Last OV 11/20/2023  Next OV No Future Appointments        Rosuvastatin 10 MG  Last time medication was refilled 11/27/2023  Quantity and # of refills 90 w/ 0  Last OV 11/20/2023  Next OV No Future Appointments      Medication failed protocol.

## 2024-03-01 DIAGNOSIS — E78.2 MIXED HYPERLIPIDEMIA: ICD-10-CM

## 2024-03-01 DIAGNOSIS — I10 ESSENTIAL HYPERTENSION: ICD-10-CM

## 2024-03-01 RX ORDER — ROSUVASTATIN CALCIUM 10 MG/1
10 TABLET, COATED ORAL DAILY
Qty: 90 TABLET | Refills: 0 | OUTPATIENT
Start: 2024-03-01

## 2024-03-01 RX ORDER — LOSARTAN POTASSIUM AND HYDROCHLOROTHIAZIDE 25; 100 MG/1; MG/1
1 TABLET ORAL DAILY
Qty: 90 TABLET | Refills: 0 | OUTPATIENT
Start: 2024-03-01

## 2024-05-21 ENCOUNTER — OFFICE VISIT (OUTPATIENT)
Dept: ELECTROPHYSIOLOGY | Facility: HOSPITAL | Age: 69
End: 2024-05-21
Attending: INTERNAL MEDICINE

## 2024-05-21 DIAGNOSIS — G62.9 NEUROPATHY: ICD-10-CM

## 2024-05-21 PROCEDURE — 95886 MUSC TEST DONE W/N TEST COMP: CPT | Performed by: OTHER

## 2024-05-21 PROCEDURE — 95913 NRV CNDJ TEST 13/> STUDIES: CPT | Performed by: OTHER

## 2024-05-21 NOTE — PROCEDURES
[facility]  Nerve Conduction & EMG Report                      Shivani Thompson D.O.  [facility]   EMG department   [address] [place] [id number]  [phone]          Patient name: Orestes Barrientos  YOB: 1955  Referring provider: Dr. Jordan  Reason for study: Eval for polyneuropathy and mononeuropathy  Brief history: 69 year old male with several month history of right more than left intermittent hand numbness, stiffness and cold sensation.  He also has been having bilateral foot numbness and tingling.      Sensory and motor nerve conduction studies, and needle EMG:  Please see separate sheet for waveforms and quantitative nerve conduction data, as well as for tabulated form of electromyographic data.      Summary:   1.  The right median sensory response was absent.  The left median sensory response was prolonged, and had decreased amplitude.  2.  The bilateral ulnar sensory responses had decreased amplitudes, and normal distal latencies.  3.  The bilateral radial sensory responses were normal.  4.  The bilateral sural sensory responses were absent.  5.  The right median motor response had decreased amplitude and prolonged distal latency.  Conduction velocity was slowed as well.  The left median motor response was normal.  6.  The bilateral ulnar motor responses were normal in amplitude and distal latency.  However, there was slowing of conduction velocity mildly above the elbow on both sides.  7.  The right tibial and peroneal motor responses had decreased amplitudes.  Conduction velocities were normal.  8.  Needle EMG using a concentric needle was performed on selected muscles of the right upper and lower extremities.  There was motor unit remodeling seen in the right FDI.    Conclusion:  1.  There is electrophysiologic evidence of severe right, and moderate left, median compressive mononeuropathies at the wrist, carpal tunnel.  2.  There is electrophysiologic  suggestion of bilateral ulnar compressive mononeuropathies at the elbow.  Of note, conduction velocity slowing to this degree can also be seen in patients with diabetic polyneuropathy.  3.  There is electrophysiologic evidence of a marked sensorimotor length dependent polyneuropathy.      Shivani Thompson DO  Neurology and Neuromuscular medicine  [facility]

## 2024-05-22 DIAGNOSIS — G56.00 CARPAL TUNNEL SYNDROME, UNSPECIFIED LATERALITY: Primary | ICD-10-CM

## 2024-05-22 PROBLEM — G62.9 NEUROPATHY: Status: ACTIVE | Noted: 2024-05-22

## 2024-06-12 DIAGNOSIS — E78.2 MIXED HYPERLIPIDEMIA: ICD-10-CM

## 2024-06-12 DIAGNOSIS — I10 ESSENTIAL HYPERTENSION: ICD-10-CM

## 2024-06-12 RX ORDER — ROSUVASTATIN CALCIUM 10 MG/1
10 TABLET, COATED ORAL DAILY
Qty: 90 TABLET | Refills: 0 | Status: SHIPPED | OUTPATIENT
Start: 2024-06-12

## 2024-06-12 RX ORDER — LOSARTAN POTASSIUM AND HYDROCHLOROTHIAZIDE 25; 100 MG/1; MG/1
1 TABLET ORAL DAILY
Qty: 90 TABLET | Refills: 0 | Status: SHIPPED | OUTPATIENT
Start: 2024-06-12

## 2024-07-08 DIAGNOSIS — I10 ESSENTIAL HYPERTENSION: ICD-10-CM

## 2024-07-08 RX ORDER — METOPROLOL TARTRATE 50 MG/1
25 TABLET, FILM COATED ORAL 2 TIMES DAILY
Qty: 90 TABLET | Refills: 1 | Status: SHIPPED | OUTPATIENT
Start: 2024-07-08

## 2024-07-08 NOTE — TELEPHONE ENCOUNTER
Last time medication was refilled: 1/19/23  Next office visit due/scheduled: 7/23/24  Last office visit: 11/20/23  Last Labs: 11/3/23  Failed protocol

## 2024-07-08 NOTE — TELEPHONE ENCOUNTER
METOPROLOL TARTRATE 50 MG Oral Tab   90 Day Supply    Last OV 11/20/23    Next OV 7/23/24    [facility] #[id number] [place] [address] [address], [phone], [phone] [[id number]]     Patient 2 weeks of medication left

## 2024-07-23 ENCOUNTER — TELEPHONE (OUTPATIENT)
Dept: INTERNAL MEDICINE CLINIC | Facility: CLINIC | Age: 69
End: 2024-07-23

## 2024-07-23 ENCOUNTER — OFFICE VISIT (OUTPATIENT)
Dept: INTERNAL MEDICINE CLINIC | Facility: CLINIC | Age: 69
End: 2024-07-23
Payer: COMMERCIAL

## 2024-07-23 VITALS
SYSTOLIC BLOOD PRESSURE: 138 MMHG | DIASTOLIC BLOOD PRESSURE: 84 MMHG | HEART RATE: 80 BPM | OXYGEN SATURATION: 99 % | WEIGHT: 233 LBS | RESPIRATION RATE: 16 BRPM | BODY MASS INDEX: 31.56 KG/M2 | TEMPERATURE: 98 F | HEIGHT: 72 IN

## 2024-07-23 DIAGNOSIS — E78.2 MIXED HYPERLIPIDEMIA: ICD-10-CM

## 2024-07-23 DIAGNOSIS — Z12.5 PROSTATE CANCER SCREENING: ICD-10-CM

## 2024-07-23 DIAGNOSIS — I10 ESSENTIAL HYPERTENSION: Primary | ICD-10-CM

## 2024-07-23 DIAGNOSIS — I25.10 CORONARY ARTERY DISEASE INVOLVING NATIVE CORONARY ARTERY OF NATIVE HEART WITHOUT ANGINA PECTORIS: ICD-10-CM

## 2024-07-23 PROCEDURE — 3008F BODY MASS INDEX DOCD: CPT | Performed by: INTERNAL MEDICINE

## 2024-07-23 PROCEDURE — 99214 OFFICE O/P EST MOD 30 MIN: CPT | Performed by: INTERNAL MEDICINE

## 2024-07-23 PROCEDURE — 3079F DIAST BP 80-89 MM HG: CPT | Performed by: INTERNAL MEDICINE

## 2024-07-23 PROCEDURE — 3075F SYST BP GE 130 - 139MM HG: CPT | Performed by: INTERNAL MEDICINE

## 2024-07-23 RX ORDER — TADALAFIL 20 MG/1
TABLET ORAL
Qty: 24 TABLET | Refills: 0 | Status: SHIPPED | OUTPATIENT
Start: 2024-07-23

## 2024-07-23 NOTE — PROGRESS NOTES
Subjective:   Patient ID: Orestes Barrientos is a 69 year old male.    HPI  Orestes Barrientos is a 69 year old male.     HPI:   Patient presents for recheck of his hypertension, HLD and CAD. Pt has been taking medications as instructed, no medication side effects, home BP monitoring in the range of 130's systolic and 70's diastolic.  Denies cp or sob    Wt Readings from Last 6 Encounters:   07/23/24 233 lb (105.7 kg)   11/20/23 243 lb (110.2 kg)   01/09/23 250 lb (113.4 kg)   07/14/22 255 lb (115.7 kg)   07/13/21 254 lb (115.2 kg)   03/16/21 250 lb (113.4 kg)     Body mass index is 31.6 kg/m².    Lab Results   Component Value Date    CHOLEST 101 08/15/2022    CHOLEST 102 07/26/2021    CHOLEST 94 06/01/2020     Lab Results   Component Value Date    HDL 37 (L) 08/15/2022    HDL 35 (L) 07/26/2021    HDL 37 (L) 06/01/2020     Lab Results   Component Value Date    TRIG 79 08/15/2022    TRIG 96 07/26/2021    TRIG 58 06/01/2020     Lab Results   Component Value Date    LDL 48 08/15/2022    LDL 48 07/26/2021    LDL 45 06/01/2020     Lab Results   Component Value Date    AST 25 08/15/2022    AST 18 07/26/2021    AST 18 03/10/2021     Lab Results   Component Value Date    ALT 29 08/15/2022    ALT 28 07/26/2021    ALT 28 03/10/2021     Lab Results   Component Value Date     (H) 08/15/2022     (H) 07/26/2021     (H) 03/10/2021       Current Outpatient Medications   Medication Sig Dispense Refill    Tadalafil (CIALIS) 20 MG Oral Tab Take one tablet by mouth half hour prior to intercourse (max dose 1 tablet /24 hours) 24 tablet 0    metoprolol tartrate 50 MG Oral Tab Take 0.5 tablets (25 mg total) by mouth 2 (two) times daily. 90 tablet 1    ROSUVASTATIN 10 MG Oral Tab TAKE 1 TABLET(10 MG) BY MOUTH DAILY 90 tablet 0    LOSARTAN-HYDROCHLOROTHIAZIDE 100-25 MG Oral Tab TAKE 1 TABLET BY MOUTH DAILY 90 tablet 0    aspirin 81 MG Oral Chew Tab Chew 1 tablet (81 mg total) by mouth daily.      Ascorbic Acid (VITAMIN C) 100  MG Oral Tab Take 1 tablet (100 mg total) by mouth daily.      Multiple Vitamin (MULTI-VITAMIN OR) Take 1 tablet by mouth daily.          Past Medical History:    Acute MI (HCC)    Alopecia    Chronic sinusitis    Heart attack (HCC)    11/2010    High blood pressure    High cholesterol    Lumbago    May-Thurner syndrome    Renal disorder    CKD    Sinusitis    Visual impairment    glasses      Past Surgical History:   Procedure Laterality Date    Cath bare metal stent (bms)      x2    Foot/toes surgery proc unlisted      foot surgery    Hip surgery  07/2015    Other surgical history  2011    cath laser 1 distal L anterior Desc artery    Repair of nasal septum  1998    Total hip replacement Left     2014      Social History:    Social History     Socioeconomic History    Marital status:    Tobacco Use    Smoking status: Never    Smokeless tobacco: Never   Vaping Use    Vaping status: Never Used   Substance and Sexual Activity    Alcohol use: No    Drug use: No   Other Topics Concern    Caffeine Concern Yes    Exercise No         REVIEW OF SYSTEMS:   GENERAL HEALTH: feels well otherwise  SKIN: denies any unusual skin lesions or rashes  RESPIRATORY: denies shortness of breath with exertion  CARDIOVASCULAR: denies chest pain on exertion  GI: denies abdominal pain and denies heartburn  NEURO: denies headaches    EXAM:   /84   Pulse 80   Temp 98.1 °F (36.7 °C)   Resp 16   Ht 6' (1.829 m)   Wt 233 lb (105.7 kg)   SpO2 99%   BMI 31.60 kg/m²   GENERAL: well developed, well nourished,in no apparent distress  SKIN: no rashes,no suspicious lesions  HEENT: atraumatic, normocephalic,ears and throat are clear  NECK: supple,no adenopathy,no bruits  LUNGS: clear to auscultation  CARDIO: RRR without murmur  GI: good BS's,no masses, HSM or tenderness  EXTREMITIES: no cyanosis, clubbing or edema    ASSESSMENT AND PLAN:   Pt presents for a recheck of his hypertension, HLD and CAD which are at goal  PLAN: will continue  present medications, reviewed diet, exercise and weight control.  Cont control of cad risk factors   The patient indicates understanding of these issues and agrees to the plan.  The patient is asked to return in 6 m.    History/Other:   Review of Systems  Current Outpatient Medications   Medication Sig Dispense Refill    Tadalafil (CIALIS) 20 MG Oral Tab Take one tablet by mouth half hour prior to intercourse (max dose 1 tablet /24 hours) 24 tablet 0    metoprolol tartrate 50 MG Oral Tab Take 0.5 tablets (25 mg total) by mouth 2 (two) times daily. 90 tablet 1    ROSUVASTATIN 10 MG Oral Tab TAKE 1 TABLET(10 MG) BY MOUTH DAILY 90 tablet 0    LOSARTAN-HYDROCHLOROTHIAZIDE 100-25 MG Oral Tab TAKE 1 TABLET BY MOUTH DAILY 90 tablet 0    aspirin 81 MG Oral Chew Tab Chew 1 tablet (81 mg total) by mouth daily.      Ascorbic Acid (VITAMIN C) 100 MG Oral Tab Take 1 tablet (100 mg total) by mouth daily.      Multiple Vitamin (MULTI-VITAMIN OR) Take 1 tablet by mouth daily.         Allergies:  Allergies   Allergen Reactions    Tetracyclines & Related DIARRHEA    Viibryd NAUSEA ONLY     \"TABS\"    Vilazodone Hcl NAUSEA ONLY     \"TABS\"       Objective:   Physical Exam    Assessment & Plan:   1. Essential hypertension    2. Mixed hyperlipidemia    3. Coronary artery disease involving native coronary artery of native heart without angina pectoris    4. Prostate cancer screening        Orders Placed This Encounter   Procedures    Comp Metabolic Panel (14)    Lipid Panel    Urinalysis, Routine    CBC With Differential With Platelet    PSA Total, Screen       Meds This Visit:  Requested Prescriptions     Signed Prescriptions Disp Refills    Tadalafil (CIALIS) 20 MG Oral Tab 24 tablet 0     Sig: Take one tablet by mouth half hour prior to intercourse (max dose 1 tablet /24 hours)       Imaging & Referrals:  None

## 2024-07-23 NOTE — TELEPHONE ENCOUNTER
Tadalafil (CIALIS) 20 MG Oral Tab     Needs alternate medication.     Fax in triage for reference

## 2024-07-24 ENCOUNTER — LAB ENCOUNTER (OUTPATIENT)
Dept: LAB | Age: 69
End: 2024-07-24
Attending: INTERNAL MEDICINE
Payer: COMMERCIAL

## 2024-07-24 DIAGNOSIS — E78.2 MIXED HYPERLIPIDEMIA: ICD-10-CM

## 2024-07-24 DIAGNOSIS — Z12.11 COLON CANCER SCREENING: ICD-10-CM

## 2024-07-24 DIAGNOSIS — Z00.00 ROUTINE GENERAL MEDICAL EXAMINATION AT A HEALTH CARE FACILITY: ICD-10-CM

## 2024-07-24 DIAGNOSIS — I10 ESSENTIAL HYPERTENSION: ICD-10-CM

## 2024-07-24 DIAGNOSIS — Z12.5 PROSTATE CANCER SCREENING: ICD-10-CM

## 2024-07-24 LAB
ALBUMIN SERPL-MCNC: 4.2 G/DL (ref 3.2–4.8)
ALBUMIN/GLOB SERPL: 1.4 {RATIO} (ref 1–2)
ALP LIVER SERPL-CCNC: 59 U/L
ALT SERPL-CCNC: 20 U/L
ANION GAP SERPL CALC-SCNC: 8 MMOL/L (ref 0–18)
AST SERPL-CCNC: 20 U/L (ref ?–34)
BASOPHILS # BLD AUTO: 0.04 X10(3) UL (ref 0–0.2)
BASOPHILS NFR BLD AUTO: 0.6 %
BILIRUB SERPL-MCNC: 0.8 MG/DL (ref 0.2–1.1)
BILIRUB UR QL STRIP.AUTO: NEGATIVE
BUN BLD-MCNC: 15 MG/DL (ref 9–23)
CALCIUM BLD-MCNC: 9.3 MG/DL (ref 8.7–10.4)
CHLORIDE SERPL-SCNC: 107 MMOL/L (ref 98–112)
CHOLEST SERPL-MCNC: 109 MG/DL (ref ?–200)
CLARITY UR REFRACT.AUTO: CLEAR
CO2 SERPL-SCNC: 26 MMOL/L (ref 21–32)
COLOR UR AUTO: YELLOW
COMPLEXED PSA SERPL-MCNC: 3.12 NG/ML (ref ?–4)
CREAT BLD-MCNC: 1.21 MG/DL
EGFRCR SERPLBLD CKD-EPI 2021: 64 ML/MIN/1.73M2 (ref 60–?)
EOSINOPHIL # BLD AUTO: 0.03 X10(3) UL (ref 0–0.7)
EOSINOPHIL NFR BLD AUTO: 0.4 %
ERYTHROCYTE [DISTWIDTH] IN BLOOD BY AUTOMATED COUNT: 13.7 %
FASTING PATIENT LIPID ANSWER: YES
FASTING STATUS PATIENT QL REPORTED: YES
GLOBULIN PLAS-MCNC: 2.9 G/DL (ref 2.8–4.4)
GLUCOSE BLD-MCNC: 100 MG/DL (ref 70–99)
GLUCOSE UR STRIP.AUTO-MCNC: NORMAL MG/DL
HCT VFR BLD AUTO: 49.9 %
HDLC SERPL-MCNC: 36 MG/DL (ref 40–59)
HGB BLD-MCNC: 16.5 G/DL
IMM GRANULOCYTES # BLD AUTO: 0.02 X10(3) UL (ref 0–1)
IMM GRANULOCYTES NFR BLD: 0.3 %
KETONES UR STRIP.AUTO-MCNC: NEGATIVE MG/DL
LDLC SERPL CALC-MCNC: 55 MG/DL (ref ?–100)
LEUKOCYTE ESTERASE UR QL STRIP.AUTO: NEGATIVE
LYMPHOCYTES # BLD AUTO: 1.22 X10(3) UL (ref 1–4)
LYMPHOCYTES NFR BLD AUTO: 17.6 %
MCH RBC QN AUTO: 29 PG (ref 26–34)
MCHC RBC AUTO-ENTMCNC: 33.1 G/DL (ref 31–37)
MCV RBC AUTO: 87.7 FL
MONOCYTES # BLD AUTO: 0.47 X10(3) UL (ref 0.1–1)
MONOCYTES NFR BLD AUTO: 6.8 %
NEUTROPHILS # BLD AUTO: 5.15 X10 (3) UL (ref 1.5–7.7)
NEUTROPHILS # BLD AUTO: 5.15 X10(3) UL (ref 1.5–7.7)
NEUTROPHILS NFR BLD AUTO: 74.3 %
NITRITE UR QL STRIP.AUTO: NEGATIVE
NONHDLC SERPL-MCNC: 73 MG/DL (ref ?–130)
OSMOLALITY SERPL CALC.SUM OF ELEC: 293 MOSM/KG (ref 275–295)
PH UR STRIP.AUTO: 5.5 [PH] (ref 5–8)
PLATELET # BLD AUTO: 244 10(3)UL (ref 150–450)
POTASSIUM SERPL-SCNC: 4.2 MMOL/L (ref 3.5–5.1)
PROT SERPL-MCNC: 7.1 G/DL (ref 5.7–8.2)
PROT UR STRIP.AUTO-MCNC: 30 MG/DL
RBC # BLD AUTO: 5.69 X10(6)UL
RBC UR QL AUTO: NEGATIVE
SODIUM SERPL-SCNC: 141 MMOL/L (ref 136–145)
SP GR UR STRIP.AUTO: 1.03 (ref 1–1.03)
TRIGL SERPL-MCNC: 96 MG/DL (ref 30–149)
TSI SER-ACNC: 2.11 MIU/ML (ref 0.55–4.78)
UROBILINOGEN UR STRIP.AUTO-MCNC: NORMAL MG/DL
VLDLC SERPL CALC-MCNC: 14 MG/DL (ref 0–30)
WBC # BLD AUTO: 6.9 X10(3) UL (ref 4–11)

## 2024-07-24 PROCEDURE — 85025 COMPLETE CBC W/AUTO DIFF WBC: CPT

## 2024-07-24 PROCEDURE — 84443 ASSAY THYROID STIM HORMONE: CPT

## 2024-07-24 PROCEDURE — 80061 LIPID PANEL: CPT

## 2024-07-24 PROCEDURE — 81001 URINALYSIS AUTO W/SCOPE: CPT

## 2024-07-24 PROCEDURE — 80053 COMPREHEN METABOLIC PANEL: CPT

## 2024-07-31 NOTE — TELEPHONE ENCOUNTER
Medication requested: Tadalafil (CIALIS) 20 MG Oral Tab     Is patient requesting 30 or 90 day supply:  30    Pharmacy name/location:  [facility] #[id number] [place] [address] [address], [phone], [phone]     Problem:  Drug not covered by patient plan.    Additional notes:  Alternative: SILDENAFIL TAB MG, VARDENAFIL TAB MG

## 2024-07-31 NOTE — TELEPHONE ENCOUNTER
[facility] to office patient GoodRx coupon for script.

## 2024-08-13 ENCOUNTER — OFFICE VISIT (OUTPATIENT)
Dept: INTERNAL MEDICINE CLINIC | Facility: CLINIC | Age: 69
End: 2024-08-13
Payer: MEDICARE

## 2024-08-13 ENCOUNTER — TELEPHONE (OUTPATIENT)
Dept: INTERNAL MEDICINE CLINIC | Facility: CLINIC | Age: 69
End: 2024-08-13

## 2024-08-13 ENCOUNTER — HOSPITAL ENCOUNTER (OUTPATIENT)
Dept: ULTRASOUND IMAGING | Facility: HOSPITAL | Age: 69
Discharge: HOME | End: 2024-08-13
Attending: PHYSICIAN ASSISTANT
Payer: MEDICARE

## 2024-08-13 VITALS
TEMPERATURE: 98 F | DIASTOLIC BLOOD PRESSURE: 72 MMHG | RESPIRATION RATE: 18 BRPM | HEART RATE: 78 BPM | SYSTOLIC BLOOD PRESSURE: 122 MMHG | HEIGHT: 72 IN | BODY MASS INDEX: 31.83 KG/M2 | WEIGHT: 235 LBS | OXYGEN SATURATION: 98 %

## 2024-08-13 DIAGNOSIS — M79.89 RIGHT LEG SWELLING: ICD-10-CM

## 2024-08-13 DIAGNOSIS — I82.431 ACUTE DEEP VEIN THROMBOSIS (DVT) OF POPLITEAL VEIN OF RIGHT LOWER EXTREMITY (HCC): Primary | ICD-10-CM

## 2024-08-13 DIAGNOSIS — Z86.718 HISTORY OF DVT (DEEP VEIN THROMBOSIS): ICD-10-CM

## 2024-08-13 DIAGNOSIS — I25.110 CORONARY ARTERY DISEASE INVOLVING NATIVE CORONARY ARTERY OF NATIVE HEART WITH UNSTABLE ANGINA PECTORIS (HCC): ICD-10-CM

## 2024-08-13 DIAGNOSIS — Z86.718 PERSONAL HISTORY OF DVT (DEEP VEIN THROMBOSIS): ICD-10-CM

## 2024-08-13 DIAGNOSIS — I20.89 ATYPICAL ANGINA (HCC): ICD-10-CM

## 2024-08-13 DIAGNOSIS — R42 DIZZINESS: Primary | ICD-10-CM

## 2024-08-13 LAB
ATRIAL RATE: 68 BPM
P AXIS: 49 DEGREES
P-R INTERVAL: 160 MS
Q-T INTERVAL: 448 MS
QRS DURATION: 94 MS
QTC CALCULATION (BEZET): 476 MS
R AXIS: 66 DEGREES
T AXIS: 49 DEGREES
VENTRICULAR RATE: 68 BPM

## 2024-08-13 PROCEDURE — 99214 OFFICE O/P EST MOD 30 MIN: CPT | Performed by: PHYSICIAN ASSISTANT

## 2024-08-13 PROCEDURE — G2211 COMPLEX E/M VISIT ADD ON: HCPCS | Performed by: PHYSICIAN ASSISTANT

## 2024-08-13 PROCEDURE — 93971 EXTREMITY STUDY: CPT | Performed by: PHYSICIAN ASSISTANT

## 2024-08-13 PROCEDURE — 93000 ELECTROCARDIOGRAM COMPLETE: CPT | Performed by: PHYSICIAN ASSISTANT

## 2024-08-13 RX ORDER — APIXABAN 5 MG (74)
KIT ORAL
Qty: 60 EACH | Refills: 0 | Status: SHIPPED | OUTPATIENT
Start: 2024-08-13 | End: 2024-09-12

## 2024-08-13 NOTE — PROGRESS NOTES
Orestes Barrientos is a 69 year old male.  HPI:   Pt c/o an episode of lightheadedness, dizziness, nauseated, like he was about to pass out today. This happened at work at [employer], he was lifting and moving boxes at the time. Sat down, caught his breath, and symptoms resolved.     This has happened multiple times at home as well, usually while standing/walking around. Does not monitor his BP.   PMH CAD, stents placed in 2010. He has not seen his cardiologist recently.   Denies chest pain, syncope, or SOB.    Also c/o 3 mos of swelling of the right leg. Mild. No pain. PMH L leg swelling due to extensive DVT, May Thurner syndrome, in 2020. Not on blood thinners.  Current Outpatient Medications   Medication Sig Dispense Refill    Tadalafil (CIALIS) 20 MG Oral Tab Take one tablet by mouth half hour prior to intercourse (max dose 1 tablet /24 hours) 24 tablet 0    metoprolol tartrate 50 MG Oral Tab Take 0.5 tablets (25 mg total) by mouth 2 (two) times daily. 90 tablet 1    ROSUVASTATIN 10 MG Oral Tab TAKE 1 TABLET(10 MG) BY MOUTH DAILY 90 tablet 0    LOSARTAN-HYDROCHLOROTHIAZIDE 100-25 MG Oral Tab TAKE 1 TABLET BY MOUTH DAILY 90 tablet 0    aspirin 81 MG Oral Chew Tab Chew 1 tablet (81 mg total) by mouth daily.      Ascorbic Acid (VITAMIN C) 100 MG Oral Tab Take 1 tablet (100 mg total) by mouth daily.      Multiple Vitamin (MULTI-VITAMIN OR) Take 1 tablet by mouth daily.          Past Medical History:    Acute MI (HCC)    Alopecia    Chronic sinusitis    Heart attack (HCC)    11/2010    High blood pressure    High cholesterol    Lumbago    May-Thurner syndrome    Renal disorder    CKD    Sinusitis    Visual impairment    glasses      Social History:  Social History     Socioeconomic History    Marital status:    Tobacco Use    Smoking status: Never    Smokeless tobacco: Never   Vaping Use    Vaping status: Never Used   Substance and Sexual Activity    Alcohol use: No    Drug use: No   Other Topics Concern    Caffeine  Concern Yes    Exercise No        REVIEW OF SYSTEMS:   GENERAL HEALTH: feels well otherwise. Denies fever, chills, unintentional weight change  SKIN: denies any unusual skin lesions or rashes  RESPIRATORY: denies shortness of breath with exertion, denies cough or wheezing  CARDIOVASCULAR: denies chest pain or palpitations, denies leg swelling  GI: denies abdominal pain and denies heartburn. Denies nausea, vomiting, diarrhea, constipation  NEURO: denies headaches, dizziness, weakness, syncope    EXAM:   /72   Pulse 78   Temp 97.8 °F (36.6 °C)   Resp 18   Ht 6' (1.829 m)   Wt 235 lb (106.6 kg)   SpO2 98%   BMI 31.87 kg/m²   GENERAL: well developed, well nourished,in no apparent distress  SKIN: no rashes,no suspicious lesions, warm and dry  HEENT: atraumatic, normocephalic,ears and throat are clear  NECK: supple,no adenopathy, no thyromegaly  LUNGS: clear to auscultation b/l no W/R/R  CARDIO: RRR without murmur  GI: good BS's,no masses, HSM, distension or tenderness  EXTREMITIES: no cyanosis, clubbing or edema  MUSCULOSKELETAL: FROM, no joint swelling or bony tenderness  NEURO: a/ox3, no focal deficits  PSYCH: mood and affect normal    ASSESSMENT AND PLAN:   1. Dizziness (Primary)  EKG shows NSR, rate 68. No ST change, T wave inversion, or evidence of LVH.     No concern for acute MI at this time. However patient's exertional symptoms of dizziness, lightheadedness, and CAD history are concerning for atypical angina. He is unable to complete an exercise stress test due to peripheral neuropathy. Recommend Lexiscan resting stress test as ordered  Pt agrees to go to ER if onset of CP, SOB.    -     EKG with interpretation and Report -IN OFFICE [[id number]]  -     CARD NUC STRESS TEST LEXISCAN (CPT 89027/26575/); Future; Expected date: 08/13/2024  2. Right leg swelling  -     US VENOUS DOPPLER LEG RIGHT - DIAG IMG (CPT=93971); Future; Expected date: 08/13/2024    Check STAT us r/o DVT  3. Coronary artery  disease involving native coronary artery of native heart with unstable angina pectoris (HCC)  -     CARD NUC STRESS TEST LEXISCAN (CPT 59775/89951/); Future; Expected date: 08/13/2024  4. Atypical angina (HCC)- as above.  5. History of DVT (deep vein thrombosis)   As above.     The patient indicates understanding of these issues and agrees to the plan.  Return if symptoms worsen or fail to improve.

## 2024-08-13 NOTE — PATIENT INSTRUCTIONS
Ultrasound today at 2:30 PM at [facility]  Schedule lexiscan stress test

## 2024-08-13 NOTE — TELEPHONE ENCOUNTER
Pt informed of DVT finding on US.   Eliquis sent to pharmacy, Discussed medication indications, risks, alternatives and side effects. He agrees to start today.   Recommend hematology consult to discuss hypercoagulability workup and whether lifetime anticoagulation is recommended. Referral placed. Pt will schedule.

## 2024-08-14 ENCOUNTER — TELEPHONE (OUTPATIENT)
Dept: INTERNAL MEDICINE CLINIC | Facility: CLINIC | Age: 69
End: 2024-08-14

## 2024-08-14 NOTE — TELEPHONE ENCOUNTER
Contacted pharmacy. No insurance on file for patient at pharmacy. Pharmacy to contact patient to update insurance information and will then run script through insurance.

## 2024-08-14 NOTE — TELEPHONE ENCOUNTER
MEDICATION PROBLEM    Medication requested: Apixaban Starter Pack (ELIQUIS DVT/PE STARTER PACK) 5 MG Oral Tablet Therapy Pack     Problem:  Not Cover by INS     ALTERNATIVE:  Patient would like an alternative or a coupon      Additional notes:  Patient would like a phone call after 2pm

## 2024-08-15 ENCOUNTER — TELEPHONE (OUTPATIENT)
Dept: INTERNAL MEDICINE CLINIC | Facility: CLINIC | Age: 69
End: 2024-08-15

## 2024-08-15 NOTE — TELEPHONE ENCOUNTER
Patient form completed by provider, RINA Hsu. RINA Hsu- \" Ready for pt  please send copy to scan.\" Patient made aware and will  in front office. Copy placed to scan. Front office confirmed with patient he does not want completed form faxed.

## 2024-08-16 ENCOUNTER — MED REC SCAN ONLY (OUTPATIENT)
Dept: INTERNAL MEDICINE CLINIC | Facility: CLINIC | Age: 69
End: 2024-08-16

## 2024-09-06 DIAGNOSIS — E78.2 MIXED HYPERLIPIDEMIA: ICD-10-CM

## 2024-09-06 DIAGNOSIS — I10 ESSENTIAL HYPERTENSION: ICD-10-CM

## 2024-09-06 RX ORDER — LOSARTAN POTASSIUM AND HYDROCHLOROTHIAZIDE 25; 100 MG/1; MG/1
1 TABLET ORAL DAILY
Qty: 90 TABLET | Refills: 0 | Status: SHIPPED | OUTPATIENT
Start: 2024-09-06

## 2024-09-06 RX ORDER — ROSUVASTATIN CALCIUM 10 MG/1
10 TABLET, COATED ORAL DAILY
Qty: 90 TABLET | Refills: 0 | Status: SHIPPED | OUTPATIENT
Start: 2024-09-06

## 2024-10-25 NOTE — PROGRESS NOTES
HPI:     Orestes Barrientos is a 69 year old male with a PMH of HTN, HLD, CAD/MI, stage III CKD, LBP.      Following for:  1.  1.8 x 1.5 cm LUP renal mass (bx proven RCC)  -  S/p RFA 20  2. BPH with mild LUTS  - no meds  3. ED  - 20 mg cialis prn     PCP - Jordan  LOV 11/3/23    He presents today for f/u.  He will probably be starting lawsuit against [employer] for aqueous firefighting foam and cancer risk.     He feels well today. Exercising  min every day and losing weight. Has new partner who exercises regularly.  No abdominal pain, d/c.  Of note, His wife  in  from metastatic breast and ovarian cancer and his daughter and grandaughter have tested positive for cancer mutation. Granddaughter graduated from nursing in  and studying to be a nurse pratitioner..     AUA SS is 235 with 1/5 w, n. Mostly happy with LUTS and declines meds at this time.  PRAVEEN declined    PSA 3.12 24    CT CA 24: s/p RFA with improving post-surgical appearance  CT CA 10/24/23: continued decrease in size of left kidney lesion likely representing post-treatment changes  (14 x 10 mm). Possible sigmoid diverticulitis. Tiny pulmonary micronodules (~ 1 mm)  CT CA 9/10/22: continued interval decrease in size of low density lesion in the lateral cortex of LUP which does not enhance (~ 14 x 13 mm)  CT CA 21: decrease in size of left kidney mass (18 x 16 mm from 2.1 x 1.7 cm).  CT A/P w/ IVC 10/21/21: ablation changes with tumor remnants which are non-enhancing. We discussed this generally indicates a successful treatment.      0% potency for years. Has not tried anything for ED and wants to try viagra.     We again reviewed the NCCN guidelines for surveillance of SRMs following ablation, including:  - CT/MRI with and without IVC at 1-6 mo following ablative therapy, then CT/MRI (preferred) or abd US annually for 5 y or longer if indicated  - CXR or CT annually for 5 y    Plan for f/u in 1 y with CT C/A prior and next  would probably be last time to check as he will be 5 y out or could space out imaging if not completely resolved on CT. Observation for BPH and trial 100 mg viagra prn. Prefers in person visits.     Prior note:  He presents with a 1.8 x 1.5 cm left upper pole renal mass.  He feels well today and has no complaints.      He underwent renal US on 6/16/20 for proteinuria showing a 1.8 x 1.5 cm left upper pole hypoechoic renal mass for which dedicated CT was recommended.  CT abdomen w/ and w/o IV contrast was performed on 6/27/20 showing a 1.5 x 1.8 x 1.9 cm left upper pole exophytic mass concerning for RCC.    UA is negative and PVR was 4 mL on renal US.     Gross hematuria: none  Tobacco hx: none  Kidney stone hx: none  HISTORY:  Past Medical History:    Acute MI (HCC)    Alopecia    Chronic sinusitis    Heart attack (HCC)    11/2010    High blood pressure    High cholesterol    Lumbago    May-Thurner syndrome    Renal disorder    CKD    Sinusitis    Visual impairment    glasses      Past Surgical History:   Procedure Laterality Date    Cath bare metal stent (bms)      x2    Foot/toes surgery proc unlisted      foot surgery    Hip surgery  07/2015    Other surgical history  2011    cath laser 1 distal L anterior Desc artery    Repair of nasal septum  1998    Total hip replacement Left     2014      Family History   Problem Relation Age of Onset    Alcohol and Other Disorders Associated Mother     Heart Attack Father       Social History:   Social History     Socioeconomic History    Marital status:    Tobacco Use    Smoking status: Never    Smokeless tobacco: Never   Vaping Use    Vaping status: Never Used   Substance and Sexual Activity    Alcohol use: No    Drug use: No   Other Topics Concern    Caffeine Concern Yes    Exercise No        Medications (Active prior to today's visit):  Current Outpatient Medications   Medication Sig Dispense Refill    Sildenafil Citrate 100 MG Oral Tab Take 1 tablet (100 mg  total) by mouth daily as needed for Erectile Dysfunction. Take ~ one hour prior to sexual activity on an empty stomach 30 tablet 5    ROSUVASTATIN 10 MG Oral Tab TAKE 1 TABLET(10 MG) BY MOUTH DAILY 90 tablet 0    LOSARTAN-HYDROCHLOROTHIAZIDE 100-25 MG Oral Tab TAKE 1 TABLET BY MOUTH DAILY 90 tablet 0    Tadalafil (CIALIS) 20 MG Oral Tab Take one tablet by mouth half hour prior to intercourse (max dose 1 tablet /24 hours) 24 tablet 0    metoprolol tartrate 50 MG Oral Tab Take 0.5 tablets (25 mg total) by mouth 2 (two) times daily. 90 tablet 1    Ascorbic Acid (VITAMIN C) 100 MG Oral Tab Take 1 tablet (100 mg total) by mouth daily.      Multiple Vitamin (MULTI-VITAMIN OR) Take 1 tablet by mouth daily.           Allergies:  Allergies   Allergen Reactions    Tetracyclines & Related DIARRHEA    Viibryd NAUSEA ONLY     \"TABS\"    Vilazodone Hcl NAUSEA ONLY     \"TABS\"         ROS:     A comprehensive 10 point review of systems was completed.  Pertinent positives and negatives noted in the the HPI.    PHYSICAL EXAM:     GENERAL APPEARANCE: well, developed, well nourished, in no acute distress  NEUROLOGIC: nonfocal, alert and oriented  HEAD: normocephalic, atraumatic  EYES: sclera non-icteric  EARS: hearing intact  ORAL CAVITY: mucosa moist  NECK/THYROID: no obvious goiter or masses  LUNGS: nonlabored breathing  ABDOMEN: soft, no obvious masses or tenderness  SKIN: no obvious rashes    : as noted above     ASSESSMENT/PLAN:   Diagnoses and all orders for this visit:    Renal cancer, left (HCC)  -     URINALYSIS, AUTO, W/O SCOPE  -     Cancel: CT CHEST (CONTRAST ONLY) ABDOMEN (W+WO) (CPT=71260/74736); Future  -     CT CHEST (CONTRAST ONLY) ABDOMEN (W+WO) (CPT=71260/80230); Future    BPH with obstruction/lower urinary tract symptoms  -     URINALYSIS, AUTO, W/O SCOPE    Erectile dysfunction, unspecified erectile dysfunction type  -     URINALYSIS, AUTO, W/O SCOPE  -     Sildenafil Citrate 100 MG Oral Tab; Take 1 tablet (100 mg  total) by mouth daily as needed for Erectile Dysfunction. Take ~ one hour prior to sexual activity on an empty stomach    - as noted above.    Thanks again for this consult.    Prudencio Beck MD, FACS  Urologist  [facility]  Office: [phone]

## 2024-11-01 ENCOUNTER — HOSPITAL ENCOUNTER (OUTPATIENT)
Dept: CT IMAGING | Facility: HOSPITAL | Age: 69
Discharge: HOME | End: 2024-11-01
Attending: UROLOGY
Payer: MEDICARE

## 2024-11-01 DIAGNOSIS — C64.2 RENAL CANCER, LEFT (HCC): ICD-10-CM

## 2024-11-01 LAB
CREAT BLD-MCNC: 1.5 MG/DL
EGFRCR SERPLBLD CKD-EPI 2021: 50 ML/MIN/1.73M2 (ref 60–?)

## 2024-11-01 PROCEDURE — 74170 CT ABD WO CNTRST FLWD CNTRST: CPT | Performed by: UROLOGY

## 2024-11-01 PROCEDURE — 82565 ASSAY OF CREATININE: CPT

## 2024-11-01 PROCEDURE — 71260 CT THORAX DX C+: CPT | Performed by: UROLOGY

## 2024-11-04 ENCOUNTER — OFFICE VISIT (OUTPATIENT)
Dept: SURGERY | Facility: CLINIC | Age: 69
End: 2024-11-04
Payer: MEDICARE

## 2024-11-04 DIAGNOSIS — C64.2 RENAL CANCER, LEFT (HCC): Primary | ICD-10-CM

## 2024-11-04 DIAGNOSIS — N52.9 ERECTILE DYSFUNCTION, UNSPECIFIED ERECTILE DYSFUNCTION TYPE: ICD-10-CM

## 2024-11-04 DIAGNOSIS — N40.1 BPH WITH OBSTRUCTION/LOWER URINARY TRACT SYMPTOMS: ICD-10-CM

## 2024-11-04 DIAGNOSIS — N13.8 BPH WITH OBSTRUCTION/LOWER URINARY TRACT SYMPTOMS: ICD-10-CM

## 2024-11-04 LAB
APPEARANCE: CLEAR
BILIRUBIN: NEGATIVE
GLUCOSE (URINE DIPSTICK): NEGATIVE MG/DL
KETONES (URINE DIPSTICK): NEGATIVE MG/DL
LEUKOCYTES: NEGATIVE
MULTISTIX LOT#: NORMAL NUMERIC
NITRITE, URINE: NEGATIVE
OCCULT BLOOD: NEGATIVE
PH, URINE: 6 (ref 4.5–8)
SPECIFIC GRAVITY: 1.02 (ref 1–1.03)
URINE-COLOR: YELLOW
UROBILINOGEN,SEMI-QN: 0.2 MG/DL (ref 0–1.9)

## 2024-11-04 PROCEDURE — 81003 URINALYSIS AUTO W/O SCOPE: CPT | Performed by: UROLOGY

## 2024-11-04 PROCEDURE — 99214 OFFICE O/P EST MOD 30 MIN: CPT | Performed by: UROLOGY

## 2024-11-04 PROCEDURE — G2211 COMPLEX E/M VISIT ADD ON: HCPCS | Performed by: UROLOGY

## 2024-11-04 RX ORDER — SILDENAFIL 100 MG/1
100 TABLET, FILM COATED ORAL
Qty: 30 TABLET | Refills: 5 | Status: SHIPPED | OUTPATIENT
Start: 2024-11-04

## 2024-12-05 ENCOUNTER — OFFICE VISIT (OUTPATIENT)
Dept: INTERNAL MEDICINE CLINIC | Facility: CLINIC | Age: 69
End: 2024-12-05
Payer: MEDICARE

## 2024-12-05 VITALS
DIASTOLIC BLOOD PRESSURE: 82 MMHG | RESPIRATION RATE: 16 BRPM | WEIGHT: 238 LBS | HEIGHT: 72 IN | OXYGEN SATURATION: 100 % | HEART RATE: 72 BPM | TEMPERATURE: 98 F | BODY MASS INDEX: 32.23 KG/M2 | SYSTOLIC BLOOD PRESSURE: 128 MMHG

## 2024-12-05 DIAGNOSIS — N40.0 BENIGN PROSTATIC HYPERPLASIA WITHOUT LOWER URINARY TRACT SYMPTOMS: ICD-10-CM

## 2024-12-05 DIAGNOSIS — Z00.00 WELCOME TO MEDICARE PREVENTIVE VISIT: Primary | ICD-10-CM

## 2024-12-05 DIAGNOSIS — I70.0 ABDOMINAL AORTIC ATHEROSCLEROSIS (HCC): ICD-10-CM

## 2024-12-05 DIAGNOSIS — E78.2 MIXED HYPERLIPIDEMIA: ICD-10-CM

## 2024-12-05 DIAGNOSIS — Z00.00 ENCOUNTER FOR ANNUAL HEALTH EXAMINATION: ICD-10-CM

## 2024-12-05 DIAGNOSIS — G62.9 NEUROPATHY: ICD-10-CM

## 2024-12-05 DIAGNOSIS — I25.10 CORONARY ARTERY DISEASE INVOLVING NATIVE CORONARY ARTERY OF NATIVE HEART WITHOUT ANGINA PECTORIS: ICD-10-CM

## 2024-12-05 DIAGNOSIS — I10 ESSENTIAL HYPERTENSION: ICD-10-CM

## 2024-12-05 PROBLEM — I82.431 ACUTE DEEP VEIN THROMBOSIS (DVT) OF POPLITEAL VEIN OF RIGHT LOWER EXTREMITY (HCC): Status: RESOLVED | Noted: 2024-08-13 | Resolved: 2024-12-05

## 2024-12-05 NOTE — PROGRESS NOTES
Subjective:   Orestes Barrientos is a 69 year old male who presents for a Initial Annual Wellness Visit (outside the first 12 months of Medicare eligibility, no prior AWV) and scheduled follow up of multiple significant but stable problems.   HPI    Normal state of health  Denies unintentional wt loss, chest pain, bertrand or sob    Adherent with meds    PAST MEDICAL, SOCIAL, FAMILY HISTORIES REVIEWED WITH PT    History/Other:   Fall Risk Assessment:   He has been screened for Falls and is low risk.      Cognitive Assessment:   He had a completely normal cognitive assessment - see flowsheet entries       Functional Ability/Status:   Orestes Barrientos has a completely normal functional assessment. See flowsheet for details.      Depression Screening (PHQ):  PHQ-2 SCORE: 0  , done 12/5/2024            Advanced Directives:   He does NOT have a Living Will. [Do you have a living will?: No]  He does NOT have a Power of  for Health Care. [Do you have a healthcare power of ?: No]  Discussed Advance Care Planning with patient (and family/surrogate if present). Standard forms made available to patient in After Visit Summary.      Patient Active Problem List   Diagnosis    Coronary artery disease involving native coronary artery of native heart without angina pectoris    Mixed hyperlipidemia    Benign prostatic hyperplasia without lower urinary tract symptoms    Essential hypertension    History of left hip replacement    History of renal carcinoma    History of DVT (deep vein thrombosis)    Neuropathy    Abdominal aortic atherosclerosis (HCC)     Allergies:  He is allergic to tetracyclines & related, viibryd, and vilazodone hcl.    Current Medications:  Outpatient Medications Marked as Taking for the 12/5/24 encounter (Office Visit) with Epi Jordan MD   Medication Sig    ROSUVASTATIN 10 MG Oral Tab TAKE 1 TABLET(10 MG) BY MOUTH DAILY    LOSARTAN-HYDROCHLOROTHIAZIDE 100-25 MG Oral Tab TAKE 1 TABLET BY MOUTH DAILY     Tadalafil (CIALIS) 20 MG Oral Tab Take one tablet by mouth half hour prior to intercourse (max dose 1 tablet /24 hours)    metoprolol tartrate 50 MG Oral Tab Take 0.5 tablets (25 mg total) by mouth 2 (two) times daily.    Ascorbic Acid (VITAMIN C) 100 MG Oral Tab Take 1 tablet (100 mg total) by mouth daily.    Multiple Vitamin (MULTI-VITAMIN OR) Take 1 tablet by mouth daily.         Medical History:  He  has a past medical history of Acute MI (HCC), Alopecia, Chronic sinusitis, Heart attack (HCC), High blood pressure, High cholesterol, Lumbago, May-Thurner syndrome (12/31/2020), Renal disorder, Sinusitis, and Visual impairment.  Surgical History:  He  has a past surgical history that includes other surgical history (2011); foot/toes surgery proc unlisted; repair of nasal septum (1998); hip surgery (07/2015); cath bare metal stent (bms); and total hip replacement (Left).   Family History:  His family history includes Alcohol and Other Disorders Associated in his mother; Heart Attack in his father.  Social History:  He  reports that he has never smoked. He has never used smokeless tobacco. He reports that he does not drink alcohol and does not use drugs.    Tobacco:  He has never smoked tobacco.    CAGE Alcohol Screen:   CAGE screening score of 0 on 12/5/2024, showing low risk of alcohol abuse.      Patient Care Team:  Epi Jordan MD as PCP - General (Internal Medicine)    Review of Systems  A comprehensive 10 point review of systems was completed.     Pertinent positives and negatives noted in the HPI.      Objective:   Physical Exam  General: No acute distress. Alert and oriented x 3.  HEENT: Normocephalic atraumatic. Moist mucous membranes. EOM-I. PERRLA. Anicteric.  Neck: No lymphadenopathy.   Respiratory: Clear to auscultation bilaterally. No wheezes. No rhonchi.  Cardiovascular: S1, S2. Regular rate and rhythm. No murmurs, Equal pulses.   Abdomen: Soft,  no pain.  nontender, nondistended.  Positive bowel  sounds. .  Neurologic: No focal neurological deficits.   Musculoskeletal: Moves all extremities.  Extremities: No edema or cyanosis.  Integument: No rashes or lesions.    Psychiatric: Appropriate mood and affect.    /82   Pulse 72   Temp 97.8 °F (36.6 °C)   Resp 16   Ht 6' (1.829 m)   Wt 238 lb (108 kg)   SpO2 100%   BMI 32.28 kg/m²  Estimated body mass index is 32.28 kg/m² as calculated from the following:    Height as of this encounter: 6' (1.829 m).    Weight as of this encounter: 238 lb (108 kg).    Medicare Hearing Assessment:   Hearing Screening    Screening Method: Whisper Test  Whisper Test Result: Pass         Visual Acuity:   Right Eye Visual Acuity: Corrected Right Eye Chart Acuity: 20/20   Left Eye Visual Acuity: Corrected Left Eye Chart Acuity: 20/20   Both Eyes Visual Acuity: Corrected Both Eyes Chart Acuity: 20/20   Able To Tolerate Visual Acuity: Yes        Assessment & Plan:   Orestes Barrientos is a 69 year old male who presents for a Medicare Assessment.     1. Welcome to Medicare preventive visit (Primary)  2. Benign prostatic hyperplasia without lower urinary tract symptoms  3. Coronary artery disease involving native coronary artery of native heart without angina pectoris  4. Essential hypertension  5. Mixed hyperlipidemia  6. Neuropathy  7. Encounter for annual health examination  8. Abdominal aortic atherosclerosis (HCC)  Benign prostatic hyperplasia without lower urinary tract symptoms (Primary)- stable. Sees urology    Abdominal aortic atherosclerosis (HCC) -stable. continue control of cad risk factors    Coronary artery disease involving native coronary artery of native heart without angina pectoris-stable. continue control of cad risk factors  Cont current med plan    Essential hypertension-controlled. Cont current med therapy    Mixed hyperlipidemia-controlled. Cont current med therapy    Neuropathy- stable sxs. momitor  The patient indicates understanding of these issues and  agrees to the plan.        Return in about 6 months (around 6/5/2025).     Epi Jordan MD, 12/5/2024     Supplementary Documentation:   General Health:  In the past six months, have you lost more than 10 pounds without trying?: 3 - Don't know  Has your appetite been poor?: No  Type of Diet: Balanced  How does the patient maintain a good energy level?: Appropriate Exercise;Daily Walks;Stretching;Other  How would you describe your daily physical activity?: Moderate  How would you describe your current health state?: Good  How do you maintain positive mental well-being?: Social Interaction;Puzzles;Games;Visiting Friends;Visiting Family  On a scale of 0 to 10, with 0 being no pain and 10 being severe pain, what is your pain level?: 0 - (None)  In the past six months, have you experienced urine leakage?: 0-No  At any time do you feel concerned for the safety/well-being of yourself and/or your children, in your home or elsewhere?: No  Have you had any immunizations at another office such as Influenza, Hepatitis B, Tetanus, or Pneumococcal?: Yes    Health Maintenance   Topic Date Due    Colorectal Cancer Screening  Never done    Zoster Vaccines (1 of 2) Never done    Annual Depression Screening  01/01/2024    COVID-19 Vaccine (8 - 2024-25 season) 09/01/2024    Influenza Vaccine (1) 10/01/2024    Annual Physical  11/20/2024    PSA  07/24/2026    Fall Risk Screening (Annual)  Completed    Pneumococcal Vaccine: 65+ Years  Completed

## 2025-01-03 DIAGNOSIS — E78.2 MIXED HYPERLIPIDEMIA: ICD-10-CM

## 2025-01-03 DIAGNOSIS — I10 ESSENTIAL HYPERTENSION: ICD-10-CM

## 2025-01-03 RX ORDER — LOSARTAN POTASSIUM AND HYDROCHLOROTHIAZIDE 25; 100 MG/1; MG/1
1 TABLET ORAL DAILY
Qty: 90 TABLET | Refills: 0 | Status: SHIPPED | OUTPATIENT
Start: 2025-01-03

## 2025-01-03 RX ORDER — ROSUVASTATIN CALCIUM 10 MG/1
10 TABLET, COATED ORAL DAILY
Qty: 90 TABLET | Refills: 0 | Status: SHIPPED | OUTPATIENT
Start: 2025-01-03

## 2025-01-03 NOTE — TELEPHONE ENCOUNTER
Losartan-Hydrocholorothiazide 100-25MG  Last time medication was refilled 09/06/2024  Last office visit  12/05/2024  Next office visit due/scheduled   Future Appointments   Date Time Provider Department Center   4/8/2025  8:35 AM Chanel Thompson DO [facility]   6/10/2025  9:30 AM Epi Jordan MD [facility]   11/3/2025  1:00 PM Prudencio Beck MD [id number]     Medication failed protocol.      Rosuvastatin 10 MG  Last time medication was refilled 09/26/2024  Last office visit  12/05/2024  Next office visit due/scheduled   Future Appointments   Date Time Provider Department Center   4/8/2025  8:35 AM Chanel Thompson DO [facility]   6/10/2025  9:30 AM Epi Jordan MD [facility]   11/3/2025  1:00 PM Prudencio Beck MD [id number]     Passed protocol, Medication sent.

## 2025-01-06 ENCOUNTER — TELEPHONE (OUTPATIENT)
Dept: INTERNAL MEDICINE CLINIC | Facility: CLINIC | Age: 70
End: 2025-01-06

## 2025-01-06 NOTE — TELEPHONE ENCOUNTER
MAGALIS due to patient age he would like to advise his PCP that he is starting a 1 month fitness / supplemental challenge with    [employer] Fit Lean Challenge  Daily exercise, meal suggestions, supplements.    [employer] Supplements  You[employer] Factor ( Vegetable & Fruit)   Probiotic at lunch  Cleansing product     Would like to see if he can slim down with the program. And improve BMI Index    Call if there are questions or concerns.

## 2025-01-23 ENCOUNTER — TELEPHONE (OUTPATIENT)
Dept: INTERNAL MEDICINE CLINIC | Facility: CLINIC | Age: 70
End: 2025-01-23

## 2025-01-23 DIAGNOSIS — E78.2 MIXED HYPERLIPIDEMIA: Primary | ICD-10-CM

## 2025-01-23 RX ORDER — ATORVASTATIN CALCIUM 20 MG/1
20 TABLET, FILM COATED ORAL NIGHTLY
Qty: 90 TABLET | Refills: 1 | Status: SHIPPED | OUTPATIENT
Start: 2025-01-23

## 2025-01-23 NOTE — TELEPHONE ENCOUNTER
Patient walked into the office stating that his insurance will no longer cover his Rosuvastatin, he states that they will cover Metoprolol and Losartan. Please advise.

## 2025-01-23 NOTE — TELEPHONE ENCOUNTER
Insurance did not give patient list of covered alternatives. Discussed with Dr. Jordan- Switch to Atorvastatin 20 mg. Patient advised on recommendations and script sent to pharmacy.

## 2025-02-01 ENCOUNTER — PATIENT MESSAGE (OUTPATIENT)
Dept: INTERNAL MEDICINE CLINIC | Facility: CLINIC | Age: 70
End: 2025-02-01

## 2025-02-05 RX ORDER — SIMVASTATIN 80 MG
80 TABLET ORAL NIGHTLY
Qty: 30 TABLET | Refills: 0 | Status: SHIPPED | OUTPATIENT
Start: 2025-02-05

## 2025-03-08 RX ORDER — SIMVASTATIN 80 MG
80 TABLET ORAL NIGHTLY
Qty: 30 TABLET | Refills: 2 | Status: SHIPPED | OUTPATIENT
Start: 2025-03-08

## 2025-03-08 NOTE — TELEPHONE ENCOUNTER
Last time medication was refilled 2/5/25  Last office visit  12/5/24  Next office visit due/scheduled   Future Appointments   Date Time Provider Department Center   4/8/2025  8:35 AM Chanel Thompson DO [facility]   6/10/2025  9:30 AM Epi Jordan MD [facility]   11/3/2025  1:00 PM Prudencio Beck MD [id number]     Passed protocol, Medication sent.

## 2025-03-17 ENCOUNTER — TELEPHONE (OUTPATIENT)
Dept: INTERNAL MEDICINE CLINIC | Facility: CLINIC | Age: 70
End: 2025-03-17

## 2025-03-17 DIAGNOSIS — J01.00 ACUTE MAXILLARY SINUSITIS, RECURRENCE NOT SPECIFIED: Primary | ICD-10-CM

## 2025-03-17 RX ORDER — AZITHROMYCIN 250 MG/1
TABLET, FILM COATED ORAL
Qty: 6 TABLET | Refills: 0 | Status: SHIPPED | OUTPATIENT
Start: 2025-03-17 | End: 2025-03-22

## 2025-03-17 NOTE — TELEPHONE ENCOUNTER
Spoke w/Dr. Jordan, ok to send RX but if not better after the treatment then please come in for further eval  Patient voiced understanding

## 2025-03-17 NOTE — TELEPHONE ENCOUNTER
Patient not feeling well has congestion, coughing, runny nose x 3 days. He says he usually gets prescribed Zithromax by Dr. Jordan without having to come in for an appointment. I offered him an appointment tomorrow but he does not want to wait that long.   There are no  appointments available today.     Good call back number   PH: [phone]

## 2025-03-31 ENCOUNTER — OFFICE VISIT (OUTPATIENT)
Dept: INTERNAL MEDICINE CLINIC | Facility: CLINIC | Age: 70
End: 2025-03-31
Payer: MEDICARE

## 2025-03-31 VITALS
DIASTOLIC BLOOD PRESSURE: 80 MMHG | HEIGHT: 72 IN | OXYGEN SATURATION: 97 % | TEMPERATURE: 98 F | BODY MASS INDEX: 32 KG/M2 | HEART RATE: 70 BPM | SYSTOLIC BLOOD PRESSURE: 124 MMHG | RESPIRATION RATE: 16 BRPM

## 2025-03-31 DIAGNOSIS — R09.82 POST-NASAL DRIP: ICD-10-CM

## 2025-03-31 DIAGNOSIS — I10 ESSENTIAL HYPERTENSION: ICD-10-CM

## 2025-03-31 DIAGNOSIS — H65.193 ACUTE EFFUSION OF BOTH MIDDLE EARS: ICD-10-CM

## 2025-03-31 DIAGNOSIS — J40 BRONCHITIS: Primary | ICD-10-CM

## 2025-03-31 PROCEDURE — 3074F SYST BP LT 130 MM HG: CPT

## 2025-03-31 PROCEDURE — 1170F FXNL STATUS ASSESSED: CPT

## 2025-03-31 PROCEDURE — 3079F DIAST BP 80-89 MM HG: CPT

## 2025-03-31 PROCEDURE — G2211 COMPLEX E/M VISIT ADD ON: HCPCS

## 2025-03-31 PROCEDURE — 99213 OFFICE O/P EST LOW 20 MIN: CPT

## 2025-03-31 PROCEDURE — 1160F RVW MEDS BY RX/DR IN RCRD: CPT

## 2025-03-31 PROCEDURE — 1159F MED LIST DOCD IN RCRD: CPT

## 2025-03-31 RX ORDER — LOSARTAN POTASSIUM AND HYDROCHLOROTHIAZIDE 25; 100 MG/1; MG/1
1 TABLET ORAL DAILY
Qty: 90 TABLET | Refills: 0 | Status: SHIPPED | OUTPATIENT
Start: 2025-03-31

## 2025-03-31 RX ORDER — PREDNISONE 20 MG/1
40 TABLET ORAL DAILY
Qty: 10 TABLET | Refills: 0 | Status: SHIPPED | OUTPATIENT
Start: 2025-03-31 | End: 2025-04-05

## 2025-03-31 NOTE — PROGRESS NOTES
Orestes Barrientos is a 70 year old male.  HPI:   Cough  This is a new problem. The current episode started 1 to 4 weeks ago (x 2 weeks). The problem has been unchanged. The cough is Non-productive. Associated symptoms include nasal congestion, postnasal drip and rhinorrhea. Pertinent negatives include no chest pain, chills, ear pain, fever, headaches, hemoptysis, sore throat, shortness of breath or wheezing. Nothing aggravates the symptoms. Treatments tried: zpack. The treatment provided moderate relief. His past medical history is significant for bronchitis. There is no history of asthma, environmental allergies or pneumonia.      Pt had upper respiratory infection prior to cough. Took Zpack, symptoms resolved except the non productive cough.     Current Outpatient Medications   Medication Sig Dispense Refill    LOSARTAN-HYDROCHLOROTHIAZIDE 100-25 MG Oral Tab TAKE 1 TABLET BY MOUTH DAILY 90 tablet 0    predniSONE 20 MG Oral Tab Take 2 tablets (40 mg total) by mouth daily for 5 days. 10 tablet 0    simvastatin 80 MG Oral Tab TAKE 1 TABLET(80 MG) BY MOUTH EVERY NIGHT 30 tablet 2    Tadalafil (CIALIS) 20 MG Oral Tab Take one tablet by mouth half hour prior to intercourse (max dose 1 tablet /24 hours) 24 tablet 0    metoprolol tartrate 50 MG Oral Tab Take 0.5 tablets (25 mg total) by mouth 2 (two) times daily. 90 tablet 1    Ascorbic Acid (VITAMIN C) 100 MG Oral Tab Take 1 tablet (100 mg total) by mouth daily.      Multiple Vitamin (MULTI-VITAMIN OR) Take 1 tablet by mouth daily.          Past Medical History:    Acute MI (HCC)    Alopecia    Chronic sinusitis    Heart attack (HCC)    11/2010    High blood pressure    High cholesterol    Lumbago    May-Thurner syndrome    Renal disorder    CKD    Sinusitis    Visual impairment    glasses      Social History:  Social History     Socioeconomic History    Marital status:    Tobacco Use    Smoking status: Never    Smokeless tobacco: Never   Vaping Use    Vaping status:  Never Used   Substance and Sexual Activity    Alcohol use: No    Drug use: No   Other Topics Concern    Caffeine Concern Yes    Exercise No        REVIEW OF SYSTEMS:   Review of Systems   Constitutional:  Negative for chills, diaphoresis, fatigue and fever.   HENT:  Positive for postnasal drip and rhinorrhea. Negative for ear pain, sinus pressure, sinus pain and sore throat.    Respiratory:  Positive for cough. Negative for hemoptysis, shortness of breath, wheezing and stridor.    Cardiovascular:  Negative for chest pain.   Gastrointestinal: Negative.    Musculoskeletal: Negative.    Allergic/Immunologic: Negative for environmental allergies.   Neurological: Negative.  Negative for headaches.   Psychiatric/Behavioral: Negative.           EXAM:   /80   Pulse 70   Temp 97.5 °F (36.4 °C)   Resp 16   Ht 6' (1.829 m)   SpO2 97%   BMI 32.28 kg/m²   Physical Exam  Vitals and nursing note reviewed.   Constitutional:       Appearance: Normal appearance. He is normal weight.   HENT:      Right Ear: A middle ear effusion is present. Tympanic membrane is not injected or erythematous.      Left Ear: A middle ear effusion is present. Tympanic membrane is not injected or erythematous.      Nose: Mucosal edema present.      Right Sinus: No maxillary sinus tenderness or frontal sinus tenderness.      Left Sinus: No maxillary sinus tenderness or frontal sinus tenderness.      Mouth/Throat:      Lips: Pink.      Mouth: Mucous membranes are moist.      Pharynx: Uvula midline. Posterior oropharyngeal erythema and postnasal drip present.      Tonsils: No tonsillar exudate or tonsillar abscesses.   Cardiovascular:      Rate and Rhythm: Normal rate and regular rhythm.      Pulses: Normal pulses.      Heart sounds: Normal heart sounds.   Pulmonary:      Effort: Pulmonary effort is normal. No respiratory distress.      Breath sounds: Normal breath sounds. No stridor. No wheezing, rhonchi or rales.   Chest:      Chest wall: No  tenderness.   Skin:     General: Skin is warm and dry.      Capillary Refill: Capillary refill takes less than 2 seconds.   Neurological:      General: No focal deficit present.      Mental Status: He is alert and oriented to person, place, and time. Mental status is at baseline.   Psychiatric:         Mood and Affect: Mood normal.         Behavior: Behavior normal.         Thought Content: Thought content normal.         Judgment: Judgment normal.          ASSESSMENT AND PLAN:   Diagnoses and all orders for this visit:    Bronchitis  Acute effusion of both middle ears  Post-nasal drip  -    complete the burst of prednisone.  predniSONE 20 MG Oral Tab; Take 2 tablets (40 mg total) by mouth daily for 5 days.       Requested Prescriptions     Signed Prescriptions Disp Refills    predniSONE 20 MG Oral Tab 10 tablet 0     Sig: Take 2 tablets (40 mg total) by mouth daily for 5 days.         The patient indicates understanding of these issues and agrees to the plan.  The patient is asked to return if symptoms persist or worsen.

## 2025-03-31 NOTE — TELEPHONE ENCOUNTER
Last time medication was refilled 1/3/25  Last office visit  12/5/24  Next office visit due/scheduled   Future Appointments   Date Time Provider Department Center   3/31/2025 11:30 AM Karen Polanco APRN [facility]   4/8/2025  8:35 AM Chanel Thompson DO [facility]   6/10/2025  9:30 AM Epi Jordan MD [facility]   11/3/2025  1:00 PM Prudencio Beck MD [id number]       Medication failed protocol

## 2025-04-08 ENCOUNTER — OFFICE VISIT (OUTPATIENT)
Dept: NEUROLOGY | Facility: CLINIC | Age: 70
End: 2025-04-08
Payer: MEDICARE

## 2025-04-08 VITALS
OXYGEN SATURATION: 97 % | WEIGHT: 249 LBS | BODY MASS INDEX: 33 KG/M2 | DIASTOLIC BLOOD PRESSURE: 80 MMHG | SYSTOLIC BLOOD PRESSURE: 132 MMHG | HEIGHT: 73 IN | RESPIRATION RATE: 16 BRPM | HEART RATE: 71 BPM

## 2025-04-08 DIAGNOSIS — R73.03 PREDIABETES: ICD-10-CM

## 2025-04-08 DIAGNOSIS — G56.21 CUBITAL TUNNEL SYNDROME ON RIGHT: ICD-10-CM

## 2025-04-08 DIAGNOSIS — E88.810 METABOLIC SYNDROME: ICD-10-CM

## 2025-04-08 DIAGNOSIS — G56.03 BILATERAL CARPAL TUNNEL SYNDROME: ICD-10-CM

## 2025-04-08 DIAGNOSIS — G60.8 PERIPHERAL SENSORY-MOTOR AXONAL POLYNEUROPATHY: Primary | ICD-10-CM

## 2025-04-08 DIAGNOSIS — R27.8 SENSORY ATAXIA: ICD-10-CM

## 2025-04-08 DIAGNOSIS — R79.9 ABNORMAL FINDING OF BLOOD CHEMISTRY, UNSPECIFIED: ICD-10-CM

## 2025-04-08 PROCEDURE — 99204 OFFICE O/P NEW MOD 45 MIN: CPT | Performed by: OTHER

## 2025-04-08 PROCEDURE — 3008F BODY MASS INDEX DOCD: CPT | Performed by: OTHER

## 2025-04-08 PROCEDURE — 1159F MED LIST DOCD IN RCRD: CPT | Performed by: OTHER

## 2025-04-08 PROCEDURE — 3079F DIAST BP 80-89 MM HG: CPT | Performed by: OTHER

## 2025-04-08 PROCEDURE — 3075F SYST BP GE 130 - 139MM HG: CPT | Performed by: OTHER

## 2025-04-08 NOTE — PROGRESS NOTES
[facility] - [place]   Neurology    Orestes Barrientos Patient Status:  No patient class for patient encounter    1955 MRN [id number]   Location [facility], [facility] PCP Epi Jordan MD              HPI:   Orestes Barrientos is a(n) 70 year old male with history of HTN, HL, CAD, who presents at the request of Epi Jordan MD for evaluation of polyneuropathy. Started a couple of years ago. Started feeling loss of feeling in feet. Also has bilateral hand tingling, R > L. Involves mostly 2nd and 3rd digit. Hard time walking due to numbness. No history of toxic or chemical exposures. No history of smoking.    Pertinent imaging and laboratory work-up:   EMG 2024    Conclusion:  1.  There is electrophysiologic evidence of severe right, and moderate left, median compressive mononeuropathies at the wrist, carpal tunnel.  2.  There is electrophysiologic suggestion of bilateral ulnar compressive mononeuropathies at the elbow.  Of note, conduction velocity slowing to this degree can also be seen in patients with diabetic polyneuropathy.  3.  There is electrophysiologic evidence of a marked sensorimotor length dependent polyneuropathy.    Component      Latest Ref Rng 8/15/2022 2024   Cholesterol, Total      <200 mg/dL  109    HDL Cholesterol      40 - 59 mg/dL  36 (L)    Triglycerides      30 - 149 mg/dL  96    LDL Cholesterol Calc      <100 mg/dL  55    VLDL      0 - 30 mg/dL  14    NON-HDL CHOLESTEROL      <130 mg/dL  73    Patient Fasting for Lipid?  Yes    HEMOGLOBIN A1c      <5.7 % 5.7 (H)     ESTIMATED AVERAGE GLUCOSE      68 - 126 mg/dL 117     TSH      0.550 - 4.780 mIU/mL  2.113       Legend:  (H) High  (L) Low    Past Medical History:  Past Medical History:    Acute MI (HCC)    Alopecia    Chronic sinusitis    Heart attack (HCC)    2010    High blood pressure    High cholesterol    Lumbago    May-Thurner syndrome    Renal disorder    CKD    Sinusitis     Visual impairment    glasses        Past Surgical History:  Past Surgical History:   Procedure Laterality Date    Cath bare metal stent (bms)      x2    Foot/toes surgery proc unlisted      foot surgery    Hip surgery  07/2015    Other surgical history  2011    cath laser 1 distal L anterior Desc artery    Repair of nasal septum  1998    Total hip replacement Left     2014       Family History:  family history includes Alcohol and Other Disorders Associated in his mother; Heart Attack in his father.  No family history of neuropathy    Social History:   reports that he has never smoked. He has never used smokeless tobacco. He reports that he does not drink alcohol and does not use drugs.    Allergies:  Allergies[1]    MEDICATIONS:    Current Outpatient Medications:     LOSARTAN-HYDROCHLOROTHIAZIDE 100-25 MG Oral Tab, TAKE 1 TABLET BY MOUTH DAILY, Disp: 90 tablet, Rfl: 0    simvastatin 80 MG Oral Tab, TAKE 1 TABLET(80 MG) BY MOUTH EVERY NIGHT, Disp: 30 tablet, Rfl: 2    metoprolol tartrate 50 MG Oral Tab, Take 0.5 tablets (25 mg total) by mouth 2 (two) times daily., Disp: 90 tablet, Rfl: 1    Ascorbic Acid (VITAMIN C) 100 MG Oral Tab, Take 1 tablet (100 mg total) by mouth daily., Disp: , Rfl:     Multiple Vitamin (MULTI-VITAMIN OR), Take 1 tablet by mouth daily.  , Disp: , Rfl:     Tadalafil (CIALIS) 20 MG Oral Tab, Take one tablet by mouth half hour prior to intercourse (max dose 1 tablet /24 hours), Disp: 24 tablet, Rfl: 0      Review of Systems:   A comprehensive 10 point review of systems was completed.     Pertinent positives and negatives noted in the HPI.         PHYSICAL EXAM:   Neurological Exam  Vitals  Vitals:    04/08/25 0811   BP: 132/80   Pulse: 71   Resp: 16     General Appearance: Patient is a 70 year old male in no acute distress  Cardiac: Normal rate & regular rhythm  Skin: There are no rashes or other skin lesions.  Musculoskeletal: There is no scoliosis, or joint deformities  Neurologic  examination:  Mental status: Patient is alert, attentive, and oriented x 3. Language is coherent and fluent without aphasia. Memory, comprehension and ability to follow commands were intact.   Cranial nerves II-XII: Optic discs were sharp. Pupils were round and reacted to light. Extraocular movements were full. There was no face, jaw, palate or tongue weakness or atrophy. Facial sensation was normal. Hearing was grossly intact. Shoulder shrug was normal.   Motor exam revealed normal muscle bulk and tone. No atrophy or fasciculations. Manual muscle testing revealed 4+/5 in the R DF, and 4+ in R APB, o/w 5/5, walking on heels more difficult on right  Deep tendon reflexes were 2 at the biceps, brachioradialis, triceps, knee jerk, and absent ankle jerk. Plantar responses were flexor bilaterally.   Sensory exam revealed normal light touch perception. Vibratory perception was at L ankle, R MCP, and proprioception required large excursion. Pinprick and temperature were decreased up to ankle and R ulnar side of forearm, and R 4th and 5th digit, and R 2 and 3 digits. Romberg sign was absent.  Complex motor skills revealed normal coordination. Finger-nose-finger intact.   Gait was mildly wide based, cannot tandem     ASSESSMENT/ACTIVE PROBLEM LIST:     Encounter Diagnoses   Name Primary?    Peripheral sensory-motor axonal polyneuropathy Yes    Metabolic syndrome     Bilateral carpal tunnel syndrome     Cubital tunnel syndrome on right     Prediabetes     Abnormal finding of blood chemistry, unspecified     Sensory ataxia        Discussion/Plan:  Sensory axonal polyneuropathy with sensory ataxia  Discussed different diagnostic possibilities, treatment and prevention of further neuropathy, and symptomatic treatment  Check B12, folate, B6, SPEP, repeat TSH and A1c  Start PT for sensory ataxia    R > L carpal tunnel syndrome  R one severe, recommend ortho evaluation  Start wearing right wrist brace    R > L cubital tunnel  Avoid  bending elbows smaller than 90 degrees  Do not rest elbow on hard surfaces  Elbow splint to be worn every night during sleep      Requested Prescriptions      No prescriptions requested or ordered in this encounter          We discussed in depth regarding the diagnosis, prognosis, treatment. The patient was given ample opportunity to ask questions. All questions and concerns were addressed.     Shivani Thompson DO  Neuromuscular and General Neurology  [facility]             [1]   Allergies  Allergen Reactions    Tetracyclines & Related DIARRHEA    Viibryd NAUSEA ONLY     \"TABS\"    Vilazodone Hcl NAUSEA ONLY     \"TABS\"

## 2025-04-08 NOTE — PATIENT INSTRUCTIONS
Refill policies:    Allow 2-3 business days for refills; controlled substances may take longer.  Contact your pharmacy at least 5 days prior to running out of medication and have them send an electronic request or submit request through the “request refill” option in your [employer] account.  Refills are not addressed on weekends; covering physicians do not authorize routine medications on weekends.  No narcotics or controlled substances are refilled after noon on Fridays or by on call physicians.  By law, narcotics must be electronically prescribed.  A 30 day supply with no refills is the maximum allowed.  If your prescription is due for a refill, you may be due for a follow up appointment.  To best provide you care, patients receiving routine medications need to be seen at least once a year.  Patients receiving narcotic/controlled substance medications need to be seen at least once every 3 months.  In the event that your preferred pharmacy does not have the requested medication in stock (e.g. Backordered), it is your responsibility to find another pharmacy that has the requested medication available.  We will gladly send a new prescription to that pharmacy at your request.    Scheduling Tests:    If your physician has ordered radiology tests such as MRI or CT scans, please contact Central Scheduling at [phone] right away to schedule the test.  Once scheduled, the [facility] Centralized Referral Team will work with your insurance carrier to obtain pre-certification or prior authorization.  Depending on your insurance carrier, approval may take 3-10 days.  It is highly recommended patients assure they have received an authorization before having a test performed.  If test is done without insurance authorization, patient may be responsible for the entire amount billed.      Precertification and Prior Authorizations:  If your physician has recommended that you have a procedure or additional testing performed the [facility]  Centralized Referral Team will contact your insurance carrier to obtain pre-certification or prior authorization.    You are strongly encouraged to contact your insurance carrier to verify that your procedure/test has been approved and is a COVERED benefit.  Although the [facility] Centralized Referral Team does its due diligence, the insurance carrier gives the disclaimer that \"Although the procedure is authorized, this does not guarantee payment.\"    Ultimately the patient is responsible for payment.   Thank you for your understanding in this matter.  Paperwork Completion:  If you require FMLA or disability paperwork for your recovery, please make sure to either drop it off or have it faxed to our office at [phone]. Be sure the form has your name and date of birth on it.  The form will be faxed to our Forms Department and they will complete it for you.  There is a 25$ fee for all forms that need to be filled out.  Please be aware there is a 10-14 day turnaround time.  You will need to sign a release of information (TEQUILA) form if your paperwork does not come with one.  You may call the Forms Department with any questions at [phone].  Their fax number is [phone].          Tingling in 4th and 5th fingers:    Ulnar neuropathy: cubital tunnel syndrome    Recommend:   Avoid bending elbows smaller than 90 degrees  Do not rest elbow on hard surfaces  elbow splint to be worn every night during sleep: On the right:  Go to [employer]  Type in \"elbow splint for cubital tunnel\" - search/pick, not one of blue velcro ones  OR  TYPE:  Elbow Brace for Comfortable Elbow Support - Adjustable Stabilizer to Keep Your Elbow Immobilized & Your Arm Straight with Two Removable Metal    OR  \"Everyday Medical Elbow Brace for Arthritis and Cubital Tunnel Syndrome I Elbow Immobilizer Splint for Tennis Elbow I Stabilizer Support Splint with Removable Splint I Fits Both Arms I Unisex\"    Tingling in right index and middle finger- carpal  tunnel syndrome   Start wearing a wrist splint at night on the right  See orthopedic specialist

## 2025-04-08 NOTE — PROGRESS NOTES
Patient is here for neuropathy today  Patient stated he has had neuropathy in the hands and feet for sometime now.

## 2025-04-14 ENCOUNTER — LAB ENCOUNTER (OUTPATIENT)
Dept: LAB | Age: 70
End: 2025-04-14
Attending: Other
Payer: MEDICARE

## 2025-04-14 DIAGNOSIS — R73.03 PREDIABETES: ICD-10-CM

## 2025-04-14 DIAGNOSIS — R79.9 ABNORMAL FINDING OF BLOOD CHEMISTRY, UNSPECIFIED: ICD-10-CM

## 2025-04-14 DIAGNOSIS — G60.8 PERIPHERAL SENSORY-MOTOR AXONAL POLYNEUROPATHY: ICD-10-CM

## 2025-04-14 LAB
EST. AVERAGE GLUCOSE BLD GHB EST-MCNC: 126 MG/DL (ref 68–126)
FOLATE SERPL-MCNC: 16.7 NG/ML (ref 5.4–?)
HBA1C MFR BLD: 6 % (ref ?–5.7)
TSI SER-ACNC: 1.77 UIU/ML (ref 0.55–4.78)
VIT B12 SERPL-MCNC: 910 PG/ML (ref 211–911)

## 2025-04-14 PROCEDURE — 82746 ASSAY OF FOLIC ACID SERUM: CPT

## 2025-04-14 PROCEDURE — 86334 IMMUNOFIX E-PHORESIS SERUM: CPT

## 2025-04-14 PROCEDURE — 84207 ASSAY OF VITAMIN B-6: CPT

## 2025-04-14 PROCEDURE — 83521 IG LIGHT CHAINS FREE EACH: CPT

## 2025-04-14 PROCEDURE — 86225 DNA ANTIBODY NATIVE: CPT

## 2025-04-14 PROCEDURE — 83036 HEMOGLOBIN GLYCOSYLATED A1C: CPT

## 2025-04-14 PROCEDURE — 36415 COLL VENOUS BLD VENIPUNCTURE: CPT

## 2025-04-14 PROCEDURE — 86038 ANTINUCLEAR ANTIBODIES: CPT

## 2025-04-14 PROCEDURE — 84165 PROTEIN E-PHORESIS SERUM: CPT

## 2025-04-14 PROCEDURE — 82607 VITAMIN B-12: CPT

## 2025-04-14 PROCEDURE — 84443 ASSAY THYROID STIM HORMONE: CPT

## 2025-04-15 LAB
ALBUMIN SERPL ELPH-MCNC: 3.52 G/DL (ref 3.75–5.21)
ALBUMIN/GLOB SERPL: 1.27 {RATIO} (ref 1–2)
ALPHA1 GLOB SERPL ELPH-MCNC: 0.24 G/DL (ref 0.19–0.46)
ALPHA2 GLOB SERPL ELPH-MCNC: 0.76 G/DL (ref 0.48–1.05)
B-GLOBULIN SERPL ELPH-MCNC: 0.79 G/DL (ref 0.68–1.23)
DSDNA IGG SERPL IA-ACNC: 2 IU/ML (ref ?–10)
ENA AB SER QL IA: 0.2 UG/L (ref ?–0.7)
ENA AB SER QL IA: NEGATIVE
GAMMA GLOB SERPL ELPH-MCNC: 0.99 G/DL (ref 0.62–1.7)
KAPPA LC FREE SER-MCNC: 2.15 MG/DL (ref 0.33–1.94)
KAPPA LC FREE/LAMBDA FREE SER NEPH: 1.1 {RATIO} (ref 0.26–1.65)
LAMBDA LC FREE SERPL-MCNC: 1.95 MG/DL (ref 0.57–2.63)
PROT SERPL-MCNC: 6.3 G/DL (ref 5.7–8.2)

## 2025-04-17 LAB — VITAMIN B6: 26.7 UG/L

## 2025-04-21 ENCOUNTER — TELEPHONE (OUTPATIENT)
Dept: NEUROLOGY | Facility: CLINIC | Age: 70
End: 2025-04-21

## 2025-04-21 ENCOUNTER — PATIENT MESSAGE (OUTPATIENT)
Dept: NEUROLOGY | Facility: CLINIC | Age: 70
End: 2025-04-21

## 2025-04-21 DIAGNOSIS — R77.8 ABNORMAL SPEP: Primary | ICD-10-CM

## 2025-04-21 DIAGNOSIS — R26.81 UNSTEADINESS ON FEET: ICD-10-CM

## 2025-04-21 NOTE — TELEPHONE ENCOUNTER
Pt  informed via [employer] of the following information from alternate TE;    SPEP measuring blood proteins was mildly abnormal. I look for monoclonal protein, but it also measures a few subtypes, which can be elevated with inflammation or kidney disease. I recommend to repeat the SPEP in 6 months.      In addition, A1c is in the pre-diabetic range, higher than previous years. If this continues to go up, it will turn into diabetes, which can affect the nerves.      I recommend a CT head to exclude another cause of gait unsteadiness, and then re-evaluate at the next visit, as well as follow up with PCP regarding blood sugar.

## 2025-04-21 NOTE — TELEPHONE ENCOUNTER
SPEP measuring blood proteins was mildly abnormal. I look for monoclonal protein, but it also measures a few subtypes, which can be elevated with inflammation or kidney disease. I recommend to repeat the SPEP in 6 months.     In addition, A1c is in the pre-diabetic range, higher than previous years. If this continues to go up, it will turn into diabetes, which can affect the nerves.     I recommend a CT head to exclude another cause of gait unsteadiness, and then re-evaluate at the next visit, as well as follow up with PCP regarding blood sugar.

## 2025-04-22 ENCOUNTER — PATIENT MESSAGE (OUTPATIENT)
Dept: INTERNAL MEDICINE CLINIC | Facility: CLINIC | Age: 70
End: 2025-04-22

## 2025-04-22 NOTE — TELEPHONE ENCOUNTER
Dr. Jordan called patient personally and advised A1C and fasting blood sugar results do not constitute starting medications. He advised on lifestyle changes with diet and will continue to monitor. Provider left this as a detailed VM on patient phone.

## 2025-04-22 NOTE — TELEPHONE ENCOUNTER
Patient called this morning to make sure Dr Jordan got his Dopiost message - Pt would like a my chart communication- no phone calls please

## 2025-05-31 DIAGNOSIS — I10 ESSENTIAL HYPERTENSION: ICD-10-CM

## 2025-05-31 RX ORDER — SIMVASTATIN 80 MG
80 TABLET ORAL NIGHTLY
Qty: 90 TABLET | Refills: 0 | Status: SHIPPED | OUTPATIENT
Start: 2025-05-31

## 2025-05-31 RX ORDER — LOSARTAN POTASSIUM AND HYDROCHLOROTHIAZIDE 25; 100 MG/1; MG/1
1 TABLET ORAL DAILY
Qty: 90 TABLET | Refills: 0 | Status: SHIPPED | OUTPATIENT
Start: 2025-05-31

## 2025-05-31 RX ORDER — METOPROLOL TARTRATE 50 MG
25 TABLET ORAL 2 TIMES DAILY
Qty: 90 TABLET | Refills: 0 | Status: SHIPPED | OUTPATIENT
Start: 2025-05-31

## 2025-05-31 NOTE — TELEPHONE ENCOUNTER
Metoprolol 50 MG  Last time medication was refilled 07/08/2024  Last office visit  03/31/2025  Next office visit due/scheduled   Future Appointments   Date Time Provider Department Center   6/10/2025  9:30 AM Epi Jordan MD [facility] [address] [address]   9/18/2025  8:15 AM Chanel Thompson DO [facility] [place]   11/3/2025  1:00 PM Prudencio Beck MD [id number]     Medication failed protocol.            Simvastatin 80 MG  Last time medication was refilled 03/08/2025  Last office visit  03/31/2025  Next office visit due/scheduled   Future Appointments   Date Time Provider Department Center   6/10/2025  9:30 AM Epi Jordan MD [facility] [address] [facility]   9/18/2025  8:15 AM Chanel Thompson DO [facility] [place]   11/3/2025  1:00 PM Prudencio Beck MD [id number]     Passed protocol, Medication sent.

## 2025-05-31 NOTE — TELEPHONE ENCOUNTER
Last time medication was refilled 03/31/2025  Last office visit  03/312025  Next office visit due/scheduled   Future Appointments   Date Time Provider Department Center   6/10/2025  9:30 AM Epi Jordan MD [facility] [address] [facility]   9/18/2025  8:15 AM Chanel Thompson DO [facility] [place]   11/3/2025  1:00 PM Prudencio Beck MD [id number]           Medication failed protocol.

## 2025-06-09 NOTE — PROGRESS NOTES
Subjective:   Orestes Barrientos is a 70 year old male who presents for a MA AHA (Medicare Advantage Annual Health Assessment) and Subsequent Annual Wellness visit (Pt already had Initial Annual Wellness) and scheduled follow up of multiple significant but stable problems.             HPI  Normal state of health  No complaints today  Denies cp or sob  He does report worsening LE neuropathy  Has seen neuro and workup is negative    PAST MEDICAL, SOCIAL, FAMILY HISTORIES REVIEWED WITH PT    History/Other:   Fall Risk Assessment:   He has been screened for Falls and is High Risk. Fall Prevention information provided to patient in After Visit Summary.    Do you feel unsteady when standing or walking?: Yes  Do you worry about falling?: No  Have you fallen in the past year?: No     Cognitive Assessment:   He had a completely normal cognitive assessment - see flowsheet entries       Functional Ability/Status:   Orestes Barrientos has some abnormal functions as listed below:  He has Vision problems based on screening of functional status. He has Walking problems based on screening of functional status.       Depression Screening (PHQ):  PHQ-2 SCORE: 0  , done 6/10/2025            Advanced Directives:   He does NOT have a Living Will. [Do you have a living will?: No]  He does NOT have a Power of  for Health Care. [Do you have a healthcare power of ?: No]  Discussed Advance Care Planning with patient (and family/surrogate if present). Standard forms made available to patient in After Visit Summary.      Problem List[1]  Allergies:  He is allergic to tetracyclines & related, viibryd, and vilazodone hcl.    Current Medications:  Active Meds, Sig Only[2]    Medical History:  He  has a past medical history of Acute MI (HCC), Alopecia, Chronic sinusitis, Heart attack (HCC), High blood pressure, High cholesterol, Lumbago, May-Thurner syndrome (12/31/2020), Renal disorder, Sinusitis, and Visual impairment.  Surgical  History:  He  has a past surgical history that includes other surgical history (2011); foot/toes surgery proc unlisted; repair of nasal septum (1998); hip surgery (07/2015); cath bare metal stent (bms); and total hip replacement (Left).   Family History:  His family history includes Alcohol and Other Disorders Associated in his mother; Heart Attack in his father.  Social History:  He  reports that he has never smoked. He has never used smokeless tobacco. He reports that he does not drink alcohol and does not use drugs.    Tobacco:  He has never smoked tobacco.    CAGE Alcohol Screen:   CAGE screening score of 0 on 6/9/2025, showing low risk of alcohol abuse.      Patient Care Team:  Epi Jordan MD as PCP - General (Internal Medicine)    Review of Systems  A comprehensive 10 point review of systems was completed.     Pertinent positives and negatives noted in the HPI.      Objective:   Physical Exam  General: No acute distress. Alert and oriented x 3.  HEENT: Normocephalic atraumatic.   Respiratory: Clear to auscultation bilaterally. No wheezes. No rhonchi.  Cardiovascular: S1, S2. Regular rate and rhythm.   Abdomen: Soft,  no pain.  nontender, nondistended.  Positive bowel sounds. .  Extremities: No edema or cyanosis.  Integument: No rashes or lesions.    Psychiatric: Appropriate mood and affect.    /78   Pulse 78   Temp 97.6 °F (36.4 °C)   Resp 16   Ht 5' 10.25\" (1.784 m)   Wt 236 lb (107 kg)   SpO2 98%   BMI 33.62 kg/m²  Estimated body mass index is 33.62 kg/m² as calculated from the following:    Height as of this encounter: 5' 10.25\" (1.784 m).    Weight as of this encounter: 236 lb (107 kg).    Medicare Hearing Assessment:   Hearing Screening    Screening Method: Whisper Test  Whisper Test Result: Pass         Visual Acuity:   Right Eye Visual Acuity: Corrected Right Eye Chart Acuity: 20/25   Left Eye Visual Acuity: Corrected Left Eye Chart Acuity: 20/25   Both Eyes Visual Acuity: Corrected Both  Eyes Chart Acuity: 20/25   Able To Tolerate Visual Acuity: Yes        Assessment & Plan:   Orestes Barrientos is a 70 year old male who presents for a Medicare Assessment.     1. Annual physical exam (Primary)  2. Mixed hyperlipidemia  3. Essential hypertension  -     CBC With Differential With Platelet; Future; Expected date: 06/10/2025  -     Comp Metabolic Panel (14); Future; Expected date: 06/10/2025  -     Lipid Panel; Future; Expected date: 06/10/2025  -     TSH W Reflex To Free T4; Future; Expected date: 06/10/2025  -     Urinalysis, Routine; Future; Expected date: 06/10/2025  4. Coronary artery disease involving native coronary artery of native heart without angina pectoris  5. Benign prostatic hyperplasia without lower urinary tract symptoms  6. Neuropathy  -     Gabapentin; Take 1 capsule (100 mg total) by mouth 2 (two) times daily.  Dispense: 60 capsule; Refill: 0  7. Abdominal aortic atherosclerosis  8. History of renal carcinoma  9. History of DVT (deep vein thrombosis)  10. Encounter for annual health examination  11. Colon cancer screening  -     Occult Blood, Fecal, FIT Immunoassay; Future; Expected date: 06/10/2025          Abdominal aortic atherosclerosis (HCC) -stable. continue control of cad risk factors     Coronary artery disease involving native coronary artery of native heart without angina pectoris-stable. continue control of cad risk factors  Cont current med plan     Essential hypertension-controlled. Cont current med therapy     Mixed hyperlipidemia-controlled. Cont current med therapy     Neuropathy- stable sxs. momitor    History of renal carcinoma- NATALIO        The patient indicates understanding of these issues and agrees to the plan.  Continue with current treatment plan.  Lab work ordered.  Reinforced healthy diet, lifestyle, and exercise.      No follow-ups on file.     Epi Jordan MD, 6/9/2025     Supplementary Documentation:   General Health:  In the past six months, have you lost  more than 10 pounds without trying?: 3 - Don't know  Has your appetite been poor?: No  Type of Diet: Balanced, Low Salt, Low Carb  How does the patient maintain a good energy level?: Appropriate Exercise, Daily Walks, Stretching, Other  How would you describe your daily physical activity?: Moderate  How would you describe your current health state?: Fair  How do you maintain positive mental well-being?: Social Interaction, Puzzles, Games, Visiting Friends, Visiting Family  On a scale of 0 to 10, with 0 being no pain and 10 being severe pain, what is your pain level?: 0 - (None)  In the past six months, have you experienced urine leakage?: 0-No  At any time do you feel concerned for the safety/well-being of yourself and/or your children, in your home or elsewhere?: Yes  Have you had any immunizations at another office such as Influenza, Hepatitis B, Tetanus, or Pneumococcal?: No    Health Maintenance   Topic Date Due    Colorectal Cancer Screening  Never done    Zoster Vaccines (1 of 2) Never done    COVID-19 Vaccine (8 - 2024-25 season) 09/01/2024    Annual Well Visit  01/01/2025    Annual Depression Screening  01/01/2025    Fall Risk Screening (Annual)  01/01/2025    PSA  07/24/2026    Influenza Vaccine  Completed    Pneumococcal Vaccine: 50+ Years  Completed    Meningococcal B Vaccine  Aged Out            [1]   Patient Active Problem List  Diagnosis    Coronary artery disease involving native coronary artery of native heart without angina pectoris    Mixed hyperlipidemia    Benign prostatic hyperplasia without lower urinary tract symptoms    Essential hypertension    History of left hip replacement    History of renal carcinoma    History of DVT (deep vein thrombosis)    Neuropathy    Abdominal aortic atherosclerosis   [2]   Outpatient Medications Marked as Taking for the 6/10/25 encounter (Office Visit) with Epi Jordan MD   Medication Sig    gabapentin 100 MG Oral Cap Take 1 capsule (100 mg total) by mouth 2  (two) times daily.    LOSARTAN-HYDROCHLOROTHIAZIDE 100-25 MG Oral Tab TAKE 1 TABLET BY MOUTH DAILY    METOPROLOL TARTRATE 50 MG Oral Tab TAKE 1/2 TABLET(25 MG) BY MOUTH TWICE DAILY    simvastatin 80 MG Oral Tab TAKE 1 TABLET(80 MG) BY MOUTH EVERY NIGHT    Tadalafil (CIALIS) 20 MG Oral Tab Take one tablet by mouth half hour prior to intercourse (max dose 1 tablet /24 hours)    Ascorbic Acid (VITAMIN C) 100 MG Oral Tab Take 1 tablet (100 mg total) by mouth daily.    Multiple Vitamin (MULTI-VITAMIN OR) Take 1 tablet by mouth daily.

## 2025-06-10 ENCOUNTER — OFFICE VISIT (OUTPATIENT)
Dept: INTERNAL MEDICINE CLINIC | Facility: CLINIC | Age: 70
End: 2025-06-10
Payer: MEDICARE

## 2025-06-10 VITALS
TEMPERATURE: 98 F | BODY MASS INDEX: 33.79 KG/M2 | HEIGHT: 70.25 IN | DIASTOLIC BLOOD PRESSURE: 78 MMHG | SYSTOLIC BLOOD PRESSURE: 122 MMHG | WEIGHT: 236 LBS | RESPIRATION RATE: 16 BRPM | HEART RATE: 78 BPM | OXYGEN SATURATION: 98 %

## 2025-06-10 DIAGNOSIS — G62.9 NEUROPATHY: ICD-10-CM

## 2025-06-10 DIAGNOSIS — Z12.11 COLON CANCER SCREENING: ICD-10-CM

## 2025-06-10 DIAGNOSIS — Z00.00 ENCOUNTER FOR ANNUAL HEALTH EXAMINATION: ICD-10-CM

## 2025-06-10 DIAGNOSIS — I25.10 CORONARY ARTERY DISEASE INVOLVING NATIVE CORONARY ARTERY OF NATIVE HEART WITHOUT ANGINA PECTORIS: ICD-10-CM

## 2025-06-10 DIAGNOSIS — Z85.528 HISTORY OF RENAL CARCINOMA: ICD-10-CM

## 2025-06-10 DIAGNOSIS — E78.2 MIXED HYPERLIPIDEMIA: ICD-10-CM

## 2025-06-10 DIAGNOSIS — Z86.718 HISTORY OF DVT (DEEP VEIN THROMBOSIS): ICD-10-CM

## 2025-06-10 DIAGNOSIS — N40.0 BENIGN PROSTATIC HYPERPLASIA WITHOUT LOWER URINARY TRACT SYMPTOMS: ICD-10-CM

## 2025-06-10 DIAGNOSIS — I70.0 ABDOMINAL AORTIC ATHEROSCLEROSIS: ICD-10-CM

## 2025-06-10 DIAGNOSIS — I10 ESSENTIAL HYPERTENSION: ICD-10-CM

## 2025-06-10 DIAGNOSIS — Z00.00 ANNUAL PHYSICAL EXAM: Primary | ICD-10-CM

## 2025-06-10 RX ORDER — GABAPENTIN 100 MG/1
100 CAPSULE ORAL 2 TIMES DAILY
Qty: 60 CAPSULE | Refills: 0 | Status: SHIPPED | OUTPATIENT
Start: 2025-06-10

## 2025-06-11 ENCOUNTER — TELEPHONE (OUTPATIENT)
Dept: INTERNAL MEDICINE CLINIC | Facility: CLINIC | Age: 70
End: 2025-06-11

## 2025-06-11 ENCOUNTER — LAB ENCOUNTER (OUTPATIENT)
Dept: LAB | Age: 70
End: 2025-06-11
Attending: INTERNAL MEDICINE
Payer: MEDICARE

## 2025-06-11 DIAGNOSIS — I10 ESSENTIAL HYPERTENSION: ICD-10-CM

## 2025-06-11 LAB
ALBUMIN SERPL-MCNC: 4.4 G/DL (ref 3.2–4.8)
ALBUMIN/GLOB SERPL: 1.7 {RATIO} (ref 1–2)
ALP LIVER SERPL-CCNC: 55 U/L (ref 45–117)
ALT SERPL-CCNC: 26 U/L (ref 10–49)
ANION GAP SERPL CALC-SCNC: 10 MMOL/L (ref 0–18)
AST SERPL-CCNC: 27 U/L (ref ?–34)
BASOPHILS # BLD AUTO: 0.05 X10(3) UL (ref 0–0.2)
BASOPHILS NFR BLD AUTO: 0.6 %
BILIRUB SERPL-MCNC: 0.7 MG/DL (ref 0.2–1.1)
BILIRUB UR QL STRIP.AUTO: NEGATIVE
BUN BLD-MCNC: 23 MG/DL (ref 9–23)
CALCIUM BLD-MCNC: 9.4 MG/DL (ref 8.7–10.6)
CHLORIDE SERPL-SCNC: 104 MMOL/L (ref 98–112)
CHOLEST SERPL-MCNC: 119 MG/DL (ref ?–200)
CLARITY UR REFRACT.AUTO: CLEAR
CO2 SERPL-SCNC: 28 MMOL/L (ref 21–32)
COLOR UR AUTO: YELLOW
CREAT BLD-MCNC: 1.47 MG/DL (ref 0.7–1.3)
EGFRCR SERPLBLD CKD-EPI 2021: 51 ML/MIN/1.73M2 (ref 60–?)
EOSINOPHIL # BLD AUTO: 0.13 X10(3) UL (ref 0–0.7)
EOSINOPHIL NFR BLD AUTO: 1.6 %
ERYTHROCYTE [DISTWIDTH] IN BLOOD BY AUTOMATED COUNT: 15 %
FASTING PATIENT LIPID ANSWER: YES
FASTING STATUS PATIENT QL REPORTED: YES
GLOBULIN PLAS-MCNC: 2.6 G/DL (ref 2–3.5)
GLUCOSE BLD-MCNC: 94 MG/DL (ref 70–99)
GLUCOSE UR STRIP.AUTO-MCNC: NORMAL MG/DL
HCT VFR BLD AUTO: 46.8 % (ref 39–53)
HDLC SERPL-MCNC: 43 MG/DL (ref 40–59)
HGB BLD-MCNC: 15.7 G/DL (ref 13–17.5)
IMM GRANULOCYTES # BLD AUTO: 0.02 X10(3) UL (ref 0–1)
IMM GRANULOCYTES NFR BLD: 0.3 %
KETONES UR STRIP.AUTO-MCNC: NEGATIVE MG/DL
LDLC SERPL CALC-MCNC: 60 MG/DL (ref ?–100)
LEUKOCYTE ESTERASE UR QL STRIP.AUTO: NEGATIVE
LYMPHOCYTES # BLD AUTO: 1.47 X10(3) UL (ref 1–4)
LYMPHOCYTES NFR BLD AUTO: 18.6 %
MCH RBC QN AUTO: 29.2 PG (ref 26–34)
MCHC RBC AUTO-ENTMCNC: 33.5 G/DL (ref 31–37)
MCV RBC AUTO: 87 FL (ref 80–100)
MONOCYTES # BLD AUTO: 0.65 X10(3) UL (ref 0.1–1)
MONOCYTES NFR BLD AUTO: 8.2 %
NEUTROPHILS # BLD AUTO: 5.59 X10 (3) UL (ref 1.5–7.7)
NEUTROPHILS # BLD AUTO: 5.59 X10(3) UL (ref 1.5–7.7)
NEUTROPHILS NFR BLD AUTO: 70.7 %
NITRITE UR QL STRIP.AUTO: NEGATIVE
NONHDLC SERPL-MCNC: 76 MG/DL (ref ?–130)
OSMOLALITY SERPL CALC.SUM OF ELEC: 297 MOSM/KG (ref 275–295)
PH UR STRIP.AUTO: 5.5 [PH] (ref 5–8)
PLATELET # BLD AUTO: 225 10(3)UL (ref 150–450)
POTASSIUM SERPL-SCNC: 4.2 MMOL/L (ref 3.5–5.1)
PROT SERPL-MCNC: 7 G/DL (ref 5.7–8.2)
RBC # BLD AUTO: 5.38 X10(6)UL (ref 3.8–5.8)
RBC UR QL AUTO: NEGATIVE
SODIUM SERPL-SCNC: 142 MMOL/L (ref 136–145)
SP GR UR STRIP.AUTO: 1.02 (ref 1–1.03)
TRIGL SERPL-MCNC: 81 MG/DL (ref 30–149)
TSI SER-ACNC: 3 UIU/ML (ref 0.55–4.78)
UROBILINOGEN UR STRIP.AUTO-MCNC: NORMAL MG/DL
VLDLC SERPL CALC-MCNC: 12 MG/DL (ref 0–30)
WBC # BLD AUTO: 7.9 X10(3) UL (ref 4–11)

## 2025-06-11 PROCEDURE — 81003 URINALYSIS AUTO W/O SCOPE: CPT

## 2025-06-11 PROCEDURE — 84443 ASSAY THYROID STIM HORMONE: CPT

## 2025-06-11 PROCEDURE — 80061 LIPID PANEL: CPT

## 2025-06-11 PROCEDURE — 80053 COMPREHEN METABOLIC PANEL: CPT

## 2025-06-11 PROCEDURE — 36415 COLL VENOUS BLD VENIPUNCTURE: CPT

## 2025-06-11 PROCEDURE — 85025 COMPLETE CBC W/AUTO DIFF WBC: CPT

## 2025-06-19 LAB — HEMOCCULT STL QL: NEGATIVE

## 2025-06-28 ENCOUNTER — PATIENT MESSAGE (OUTPATIENT)
Dept: INTERNAL MEDICINE CLINIC | Facility: CLINIC | Age: 70
End: 2025-06-28

## 2025-06-28 DIAGNOSIS — I10 ESSENTIAL HYPERTENSION: ICD-10-CM

## 2025-06-30 RX ORDER — LOSARTAN POTASSIUM AND HYDROCHLOROTHIAZIDE 25; 100 MG/1; MG/1
1 TABLET ORAL DAILY
Qty: 90 TABLET | Refills: 0 | Status: SHIPPED | OUTPATIENT
Start: 2025-06-30

## 2025-06-30 NOTE — TELEPHONE ENCOUNTER
Last time medication was refilled 05/31/2025  Last office visit  06/10/2025  Next office visit due/scheduled   Future Appointments   Date Time Provider Department Center   9/18/2025  8:15 AM Chanel Thompson DO [facility] [place]   11/3/2025  1:00 PM Prudencio Beck MD [id number]   12/11/2025  9:15 AM Epi Jordan MD [facility] [address]     Medication passed protocol, refill sent.

## 2025-08-07 ENCOUNTER — PATIENT MESSAGE (OUTPATIENT)
Dept: INTERNAL MEDICINE CLINIC | Facility: CLINIC | Age: 70
End: 2025-08-07

## 2025-08-07 DIAGNOSIS — L84 CALLUS OF FOOT: Primary | ICD-10-CM

## 2025-08-26 ENCOUNTER — OFFICE VISIT (OUTPATIENT)
Dept: PODIATRY CLINIC | Facility: CLINIC | Age: 70
End: 2025-08-26

## 2025-08-26 DIAGNOSIS — L90.9 FAT PAD ATROPHY OF FOOT: ICD-10-CM

## 2025-08-26 DIAGNOSIS — L84 PRE-ULCERATIVE CALLUSES: Primary | ICD-10-CM

## 2025-08-26 DIAGNOSIS — M79.672 LEFT FOOT PAIN: ICD-10-CM

## 2025-08-26 PROCEDURE — 1159F MED LIST DOCD IN RCRD: CPT

## 2025-08-26 PROCEDURE — 99203 OFFICE O/P NEW LOW 30 MIN: CPT

## 2025-08-26 PROCEDURE — 1160F RVW MEDS BY RX/DR IN RCRD: CPT

## 2025-08-31 DIAGNOSIS — I10 ESSENTIAL HYPERTENSION: Primary | ICD-10-CM

## 2025-08-31 DIAGNOSIS — E78.2 MIXED HYPERLIPIDEMIA: ICD-10-CM

## 2025-09-01 RX ORDER — SIMVASTATIN 80 MG
80 TABLET ORAL NIGHTLY
Qty: 90 TABLET | Refills: 0 | Status: SHIPPED | OUTPATIENT
Start: 2025-09-01

## (undated) NOTE — LETTER
19    RE:  Sandra Stager  :  1955      To Whom It May Concern: The above patient is currently under my care and he was seen in the office today.          Sincerely,      Jaime Carpenter MD

## (undated) NOTE — LETTER
[facility] [address], [address]  Consent for Procedure/Sedation    Date: 12/10/2020    Time: 1546      1.  I authorize the performance upon Chavo Chery the following: Peripheral angiography, atherectomy, percutaneous translumi Signature of person authorized                                           Relationship to  to consent for patient: ___________________________   patient: ___________________    Witness: ______________________________________  Date: _____________________

## (undated) NOTE — LETTER
Date: 2/12/2019    Patient Name: Rodney Yeung          To Whom it may concern: The above patient was seen at the [facility] for treatment of a medical condition. This patient should be excused from attending work from 2/12/2019.     Bernett Scheuermann

## (undated) NOTE — MR AVS SNAPSHOT
After Visit Summary   5/21/2024    Orestes Barrientos   MRN: [id number]           Visit Information     Date & Time  5/21/2024 10:30 AM Provider  Chanel Thompson DO [facility]. Phone  [phone]      Allergies as of 5/21/2024  Review status set to Review Complete on 11/20/2023       Noted Reaction Type Reactions    Tetracyclines & Related 04/09/2012    DIARRHEA    Viibryd 04/09/2012    NAUSEA ONLY    \"TABS\"    Vilazodone Hcl 04/09/2012    NAUSEA ONLY    \"TABS\"      Your Current Medications        Dosage    rosuvastatin 10 MG Oral Tab Take 1 tablet (10 mg total) by mouth daily.    losartan-hydroCHLOROthiazide 100-25 MG Oral Tab Take 1 tablet by mouth daily.    METOPROLOL TARTRATE 50 MG Oral Tab TAKE 1/2 TABLET(25 MG) BY MOUTH TWICE DAILY    aspirin 81 MG Oral Chew Tab Chew 1 tablet (81 mg total) by mouth daily.    Ascorbic Acid (VITAMIN C) 100 MG Oral Tab Take 1 tablet (100 mg total) by mouth daily.    Multiple Vitamin (MULTI-VITAMIN OR) Take 1 tablet by mouth daily.        Diagnoses for This Visit    Neuropathy   [609196]             We Ordered the Following     Normal Orders This Visit    EMG (at [place]) [NEU5 CUSTOM]       Future Appointments        Provider Department    11/4/2024 2:00 PM Prudencio Beck [facility]                Did you know that [facility] [place] now offer Video Visits through [employer] for adult patients for a variety of conditions such as allergies, back pain and cold symptoms? Skip the drive and waiting room and online chat with a doctor face-to-face using your web-cam enabled computer or mobile device wherever you are. Video Visits cost $50 and can be paid hassle-free using a credit, debit, or health savings card.  Not active on [employer]? Ask us how to get signed up today!          If you receive a survey from [employer], please take a few minutes to complete it and provide feedback. We  strive to deliver the best patient experience and are looking for ways to make improvements. Your feedback will help us do so. For more information on [employer], please visit [email or web address]           No text in SmartText           No text in SmartText

## (undated) NOTE — LETTER
[facility] [address], [address] [place] [address]    Consent for Anesthesia   1.    Yuliana Guidry agree to be cared for by a physician anesthesiologist alone and/or with a nurse anesthetist, who is specially trained to monitor me and give me m allergic reactions to medications, injury to my airway, heart, lungs, vision, nerves, or muscles and in extremely rare instances death. 5. My doctor has explained to me other choices available to me for my care (alternatives).   6. Pregnant Patients (“epid Printed: 8/10/2020 at 9:46 AM    Medical Record #: [id number]                                            Page 1 of 1

## (undated) NOTE — LETTER
[facility] [address], [address]  Consent for Procedure/Sedation    Date: 12/11/2020    Time: 1250      1.  I authorize the performance upon Mason Santiago the following: Peripheral angiography, atherectomy, percutaneous translumi Signature of Patient: _______________________________________________________    Signature of person authorized                                           Relationship to  to consent for patient: ___________________________   patient: ___________________